# Patient Record
Sex: MALE | Race: WHITE | NOT HISPANIC OR LATINO | Employment: FULL TIME | URBAN - METROPOLITAN AREA
[De-identification: names, ages, dates, MRNs, and addresses within clinical notes are randomized per-mention and may not be internally consistent; named-entity substitution may affect disease eponyms.]

---

## 2018-02-15 ENCOUNTER — OFFICE VISIT (OUTPATIENT)
Dept: FAMILY MEDICINE CLINIC | Facility: CLINIC | Age: 38
End: 2018-02-15

## 2018-02-15 VITALS
TEMPERATURE: 98.4 F | HEART RATE: 80 BPM | OXYGEN SATURATION: 98 % | WEIGHT: 213.2 LBS | BODY MASS INDEX: 28.26 KG/M2 | HEIGHT: 73 IN | SYSTOLIC BLOOD PRESSURE: 120 MMHG | DIASTOLIC BLOOD PRESSURE: 78 MMHG

## 2018-02-15 DIAGNOSIS — F32.A ANXIETY AND DEPRESSION: Primary | ICD-10-CM

## 2018-02-15 DIAGNOSIS — F41.9 ANXIETY AND DEPRESSION: Primary | ICD-10-CM

## 2018-02-15 PROBLEM — S29.011A MUSCLE STRAIN OF CHEST WALL: Status: ACTIVE | Noted: 2018-02-15

## 2018-02-15 PROBLEM — J45.909 REACTIVE AIRWAY DISEASE: Status: ACTIVE | Noted: 2018-02-15

## 2018-02-15 PROBLEM — R82.90 ABNORMAL FINDING IN URINE: Status: ACTIVE | Noted: 2018-02-15

## 2018-02-15 PROBLEM — J30.9 ATOPIC RHINITIS: Status: ACTIVE | Noted: 2018-02-15

## 2018-02-15 PROBLEM — M51.369 DEGENERATION OF INTERVERTEBRAL DISC OF LUMBAR REGION: Status: ACTIVE | Noted: 2018-02-15

## 2018-02-15 PROBLEM — M51.36 DEGENERATION OF INTERVERTEBRAL DISC OF LUMBAR REGION: Status: ACTIVE | Noted: 2018-02-15

## 2018-02-15 PROBLEM — R31.9 HEMATURIA: Status: ACTIVE | Noted: 2018-02-15

## 2018-02-15 PROBLEM — IMO0002 DISPLACEMENT OF INTERVERTEBRAL DISC: Status: ACTIVE | Noted: 2018-02-15

## 2018-02-15 PROCEDURE — 99213 OFFICE O/P EST LOW 20 MIN: CPT | Performed by: FAMILY MEDICINE

## 2018-02-15 RX ORDER — PAROXETINE HYDROCHLORIDE 20 MG/1
20 TABLET, FILM COATED ORAL EVERY MORNING
Qty: 30 TABLET | Refills: 1 | Status: SHIPPED | OUTPATIENT
Start: 2018-02-15 | End: 2018-03-15 | Stop reason: SDUPTHER

## 2018-02-15 RX ORDER — MELATONIN 10 MG
CAPSULE ORAL
COMMUNITY
Start: 2014-09-29

## 2018-02-15 NOTE — PROGRESS NOTES
Subjective   Edi Dela Cruz is a 37 y.o. male who is here for   Chief Complaint   Patient presents with   • Anxiety     Re-est patient    • Depression   .     History of Present Illness   Depression: Patient complains of depression. He complains of depressed mood, difficulty concentrating, feelings of worthlessness/guilt, psychomotor agitation and recurrent thoughts of death. Onset was approximately several years ago, rapidly worsening since that time.  He denies current suicidal and homicidal plan or intent.   Family history significant for anxiety and depression.Possible organic causes contributing are: none.  Risk factors: none Previous treatment includes arleen wort and none. He complains of the following side effects from the treatment: none.  Worries about bad things might happen to his family and friends  His wife is also in the room with us and she agrees he needs medication.        The following portions of the patient's history were reviewed and updated as appropriate: allergies, current medications, past family history, past medical history, past social history, past surgical history and problem list.    Review of Systems    Objective   Physical Exam   Constitutional: He appears well-developed and well-nourished.   Cardiovascular: Normal rate.    Psychiatric: His behavior is normal. Judgment and thought content normal. His mood appears anxious. Cognition and memory are normal. He exhibits a depressed mood.   Nursing note and vitals reviewed.      Assessment/Plan   Edi was seen today for anxiety and depression.    Diagnoses and all orders for this visit:    Anxiety and depression  -     PARoxetine (PAXIL) 20 MG tablet; Take 1 tablet by mouth Every Morning.      There are no Patient Instructions on file for this visit.    There are no discontinued medications.     Return in about 1 month (around 3/15/2018) for new medication follow up.    Dr. Zane Bennett  [facility]  [place], [place]

## 2018-03-15 ENCOUNTER — OFFICE VISIT (OUTPATIENT)
Dept: FAMILY MEDICINE CLINIC | Facility: CLINIC | Age: 38
End: 2018-03-15

## 2018-03-15 VITALS
WEIGHT: 217.9 LBS | HEIGHT: 73 IN | DIASTOLIC BLOOD PRESSURE: 82 MMHG | OXYGEN SATURATION: 96 % | HEART RATE: 89 BPM | SYSTOLIC BLOOD PRESSURE: 120 MMHG | BODY MASS INDEX: 28.88 KG/M2

## 2018-03-15 DIAGNOSIS — F41.9 ANXIETY AND DEPRESSION: Primary | ICD-10-CM

## 2018-03-15 DIAGNOSIS — F32.A ANXIETY AND DEPRESSION: Primary | ICD-10-CM

## 2018-03-15 PROCEDURE — 99213 OFFICE O/P EST LOW 20 MIN: CPT | Performed by: FAMILY MEDICINE

## 2018-03-15 RX ORDER — PAROXETINE HYDROCHLORIDE 40 MG/1
40 TABLET, FILM COATED ORAL EVERY MORNING
Qty: 30 TABLET | Refills: 1 | Status: SHIPPED | OUTPATIENT
Start: 2018-03-15 | End: 2018-05-01 | Stop reason: SDUPTHER

## 2018-03-15 RX ORDER — DIAZEPAM 5 MG/1
5 TABLET ORAL 2 TIMES DAILY PRN
Qty: 10 TABLET | Refills: 0 | Status: SHIPPED | OUTPATIENT
Start: 2018-03-15 | End: 2018-10-30

## 2018-03-15 NOTE — PROGRESS NOTES
Subjective   Edi Dela Cruz is a 38 y.o. male who is here for   Chief Complaint   Patient presents with   • Follow-up   • Anxiety   • Depression   .     History of Present Illness   1 month follow up of starting Zoloft.    Anxiety: Patient complains of anxiety disorder and panic attacks.  He has the following symptoms: difficulty concentrating, feelings of losing control, irritable, racing thoughts. Onset of symptoms was approximately several years ago, gradually improving since that time. He denies current suicidal and homicidal ideation. Family history significant for anxiety and depression.Possible organic causes contributing are: none. Risk factors: none Previous treatment includes Paxil and medication.  He complains of the following side effects from the treatment: none.  Anxiety is some better  No SE  Less time thinking about horrible accidents that could happen to his family  Sleep is ok        The following portions of the patient's history were reviewed and updated as appropriate: allergies, current medications, past family history, past medical history, past social history, past surgical history and problem list.    Review of Systems    Objective   Physical Exam   Constitutional: He appears well-developed and well-nourished.   Cardiovascular: Normal rate.    Psychiatric: He has a normal mood and affect. His behavior is normal. Judgment and thought content normal.   Nursing note and vitals reviewed.      Assessment/Plan   Edi was seen today for follow-up, anxiety and depression.    Diagnoses and all orders for this visit:    Anxiety and depression  -     diazePAM (VALIUM) 5 MG tablet; Take 1 tablet by mouth 2 (Two) Times a Day As Needed for Anxiety (panic attack).  -     PARoxetine (PAXIL) 40 MG tablet; Take 1 tablet by mouth Every Morning.      Patient Instructions   Valium as needed for panic attacks      Medications Discontinued During This Encounter   Medication Reason   • PARoxetine (PAXIL) 20  MG tablet Reorder        Return in about 1 month (around 4/15/2018) for new medication follow up.    Dr. Zane Bennett  [place], [place]

## 2018-04-10 DIAGNOSIS — F41.9 ANXIETY AND DEPRESSION: ICD-10-CM

## 2018-04-10 DIAGNOSIS — F32.A ANXIETY AND DEPRESSION: ICD-10-CM

## 2018-04-10 RX ORDER — PAROXETINE HYDROCHLORIDE 20 MG/1
20 TABLET, FILM COATED ORAL EVERY MORNING
Qty: 30 TABLET | Refills: 1 | Status: SHIPPED | OUTPATIENT
Start: 2018-04-10 | End: 2018-05-01 | Stop reason: DRUGHIGH

## 2018-05-01 ENCOUNTER — OFFICE VISIT (OUTPATIENT)
Dept: FAMILY MEDICINE CLINIC | Facility: CLINIC | Age: 38
End: 2018-05-01

## 2018-05-01 VITALS
TEMPERATURE: 97.6 F | HEIGHT: 73 IN | SYSTOLIC BLOOD PRESSURE: 134 MMHG | WEIGHT: 220 LBS | DIASTOLIC BLOOD PRESSURE: 86 MMHG | OXYGEN SATURATION: 99 % | HEART RATE: 76 BPM | BODY MASS INDEX: 29.16 KG/M2

## 2018-05-01 DIAGNOSIS — F41.9 ANXIETY AND DEPRESSION: Primary | ICD-10-CM

## 2018-05-01 DIAGNOSIS — F32.A ANXIETY AND DEPRESSION: Primary | ICD-10-CM

## 2018-05-01 PROCEDURE — 99213 OFFICE O/P EST LOW 20 MIN: CPT | Performed by: FAMILY MEDICINE

## 2018-05-01 RX ORDER — FLUTICASONE PROPIONATE 50 MCG
2 SPRAY, SUSPENSION (ML) NASAL DAILY
COMMUNITY
End: 2022-03-30

## 2018-05-01 RX ORDER — PAROXETINE HYDROCHLORIDE 40 MG/1
40 TABLET, FILM COATED ORAL EVERY MORNING
Qty: 30 TABLET | Refills: 5 | Status: SHIPPED | OUTPATIENT
Start: 2018-05-01 | End: 2018-10-30 | Stop reason: SDUPTHER

## 2018-05-01 RX ORDER — IBUPROFEN 400 MG/1
400 TABLET ORAL EVERY 6 HOURS PRN
COMMUNITY
End: 2022-03-30

## 2018-05-01 NOTE — PROGRESS NOTES
Subjective   Edi Dela Cruz is a 38 y.o. male who is here for   Chief Complaint   Patient presents with   • Follow-up   • Anxiety   .   We doubled Paxil to 40 mg last ov    History of Present Illness   Anxiety: Patient complains of anxiety disorder.  He has the following symptoms: irritable. Onset of symptoms was approximately several years ago, completely resolved since that time. He denies current suicidal and homicidal ideation. Family history significant for anxiety and depression.Possible organic causes contributing are: none. Risk factors: none Previous treatment includes Paxil and medication.  He complains of the following side effects from the treatment: inability to male orgasm. libido is good, erections are good.    Anxiety is so much better  May have to change to Wellbutrin if orgasm issue remains      The following portions of the patient's history were reviewed and updated as appropriate: allergies, current medications, past family history, past medical history, past social history and problem list.    Review of Systems    Objective   Physical Exam   Constitutional: He appears well-developed and well-nourished.   Cardiovascular: Normal rate.    Pulmonary/Chest: Effort normal.   Psychiatric: He has a normal mood and affect.   Nursing note and vitals reviewed.      Assessment/Plan   Edi was seen today for follow-up and anxiety.    Diagnoses and all orders for this visit:    Anxiety and depression  -     PARoxetine (PAXIL) 40 MG tablet; Take 1 tablet by mouth Every Morning.      There are no Patient Instructions on file for this visit.    Medications Discontinued During This Encounter   Medication Reason   • PARoxetine (PAXIL) 20 MG tablet Dose adjustment   • St Johns Wort 450 MG capsule *Therapy completed   • PARoxetine (PAXIL) 40 MG tablet Reorder        Return in about 6 months (around 11/1/2018).    Dr. Zane Bennett  [facility]  [place], [place]

## 2018-05-25 DIAGNOSIS — F41.9 ANXIETY AND DEPRESSION: ICD-10-CM

## 2018-05-25 DIAGNOSIS — F32.A ANXIETY AND DEPRESSION: ICD-10-CM

## 2018-05-25 RX ORDER — PAROXETINE HYDROCHLORIDE 40 MG/1
40 TABLET, FILM COATED ORAL EVERY MORNING
Qty: 30 TABLET | Refills: 1 | Status: SHIPPED | OUTPATIENT
Start: 2018-05-25 | End: 2018-10-30 | Stop reason: SDUPTHER

## 2018-10-30 ENCOUNTER — OFFICE VISIT (OUTPATIENT)
Dept: FAMILY MEDICINE CLINIC | Facility: CLINIC | Age: 38
End: 2018-10-30

## 2018-10-30 VITALS
BODY MASS INDEX: 28.49 KG/M2 | SYSTOLIC BLOOD PRESSURE: 124 MMHG | HEIGHT: 73 IN | WEIGHT: 215 LBS | DIASTOLIC BLOOD PRESSURE: 82 MMHG | OXYGEN SATURATION: 98 % | HEART RATE: 59 BPM

## 2018-10-30 DIAGNOSIS — F41.9 ANXIETY AND DEPRESSION: Primary | ICD-10-CM

## 2018-10-30 DIAGNOSIS — F32.A ANXIETY AND DEPRESSION: Primary | ICD-10-CM

## 2018-10-30 DIAGNOSIS — Z30.09 VASECTOMY EVALUATION: ICD-10-CM

## 2018-10-30 PROBLEM — R82.90 ABNORMAL FINDING IN URINE: Status: RESOLVED | Noted: 2018-02-15 | Resolved: 2018-10-30

## 2018-10-30 PROCEDURE — 99213 OFFICE O/P EST LOW 20 MIN: CPT | Performed by: FAMILY MEDICINE

## 2018-10-30 RX ORDER — PAROXETINE HYDROCHLORIDE 40 MG/1
40 TABLET, FILM COATED ORAL EVERY MORNING
Qty: 90 TABLET | Refills: 3 | Status: SHIPPED | OUTPATIENT
Start: 2018-10-30 | End: 2019-05-03 | Stop reason: SDUPTHER

## 2018-10-30 NOTE — PROGRESS NOTES
Subjective   Edi Dela Cruz is a 38 y.o. male who is here for   Chief Complaint   Patient presents with   • Follow-up   • Anxiety   • Depression   .     History of Present Illness   Anxiety is so much better  Nearly cured  Life is much better  Work and home life improved  Working out 5 x a week  Quit drinking  Still with some orgasm issues, but not enough to stop paxil      Also would like vasectomy referral    The following portions of the patient's history were reviewed and updated as appropriate: allergies, current medications, past family history, past medical history, past social history and problem list.    Review of Systems    Objective   Physical Exam   Constitutional: He appears well-developed and well-nourished.   Cardiovascular: Normal rate.    Psychiatric: He has a normal mood and affect.   Nursing note and vitals reviewed.      Assessment/Plan   Edi was seen today for follow-up, anxiety and depression.    Diagnoses and all orders for this visit:    Anxiety and depression  -     PARoxetine (PAXIL) 40 MG tablet; Take 1 tablet by mouth Every Morning.    Vasectomy evaluation  -     Ambulatory Referral to Urology      There are no Patient Instructions on file for this visit.    Medications Discontinued During This Encounter   Medication Reason   • diazePAM (VALIUM) 5 MG tablet *Therapy completed   • PARoxetine (PAXIL) 40 MG tablet Duplicate order   • PARoxetine (PAXIL) 40 MG tablet Reorder        Return in about 6 months (around 4/30/2019).    Dr. Zane Bennett  [place], [place]

## 2019-04-30 ENCOUNTER — OFFICE VISIT (OUTPATIENT)
Dept: FAMILY MEDICINE CLINIC | Facility: CLINIC | Age: 39
End: 2019-04-30

## 2019-04-30 VITALS
OXYGEN SATURATION: 99 % | WEIGHT: 216 LBS | DIASTOLIC BLOOD PRESSURE: 98 MMHG | HEIGHT: 73 IN | BODY MASS INDEX: 28.63 KG/M2 | HEART RATE: 66 BPM | SYSTOLIC BLOOD PRESSURE: 132 MMHG

## 2019-04-30 DIAGNOSIS — F41.9 ANXIETY AND DEPRESSION: Primary | ICD-10-CM

## 2019-04-30 DIAGNOSIS — F32.A ANXIETY AND DEPRESSION: ICD-10-CM

## 2019-04-30 DIAGNOSIS — Z00.00 ROUTINE ADULT HEALTH MAINTENANCE: ICD-10-CM

## 2019-04-30 DIAGNOSIS — F51.3 SLEEP WALKING AND EATING: Primary | ICD-10-CM

## 2019-04-30 DIAGNOSIS — F32.A ANXIETY AND DEPRESSION: Primary | ICD-10-CM

## 2019-04-30 DIAGNOSIS — F41.9 ANXIETY AND DEPRESSION: ICD-10-CM

## 2019-04-30 PROBLEM — IMO0002 DISPLACEMENT OF INTERVERTEBRAL DISC: Status: RESOLVED | Noted: 2018-02-15 | Resolved: 2019-04-30

## 2019-04-30 PROBLEM — S29.011A MUSCLE STRAIN OF CHEST WALL: Status: RESOLVED | Noted: 2018-02-15 | Resolved: 2019-04-30

## 2019-04-30 PROBLEM — R31.9 HEMATURIA: Status: RESOLVED | Noted: 2018-02-15 | Resolved: 2019-04-30

## 2019-04-30 PROBLEM — Z30.09 VASECTOMY EVALUATION: Status: RESOLVED | Noted: 2018-10-30 | Resolved: 2019-04-30

## 2019-04-30 PROCEDURE — 99213 OFFICE O/P EST LOW 20 MIN: CPT | Performed by: FAMILY MEDICINE

## 2019-04-30 RX ORDER — FEXOFENADINE HCL AND PSEUDOEPHEDRINE HCI 180; 240 MG/1; MG/1
1 TABLET, EXTENDED RELEASE ORAL DAILY
COMMUNITY
End: 2019-11-05

## 2019-04-30 RX ORDER — VALACYCLOVIR HYDROCHLORIDE 1 G/1
TABLET, FILM COATED ORAL
Refills: 1 | COMMUNITY
Start: 2019-04-21 | End: 2019-11-05

## 2019-04-30 NOTE — PROGRESS NOTES
Subjective   Edi Dela Cruz is a 39 y.o. male who is here for   Chief Complaint   Patient presents with   • Anxiety   .     History of Present Illness   Anxiety: Patient complains of anxiety disorder.  He has the following symptoms: difficulty concentrating, fatigue, irritable. Onset of symptoms was approximately 10 years ago, controlled since that time. He denies current suicidal and homicidal ideation. Family history significant for anxiety.Possible organic causes contributing are: none. Risk factors: none Previous treatment includes Paxil and medication.  He complains of the following side effects from the treatment: decreased libido.    Also on Allegra D for seasonal allergies    The following portions of the patient's history were reviewed and updated as appropriate: allergies, current medications, past family history, past medical history, past social history, past surgical history and problem list.    Review of Systems    Objective   Physical Exam   Constitutional: He appears well-developed and well-nourished.   Cardiovascular: Normal rate.   Pulmonary/Chest: Effort normal.   Neurological: He is alert.   Psychiatric: He has a normal mood and affect.   Nursing note and vitals reviewed.      Assessment/Plan   Edi was seen today for anxiety.    Diagnoses and all orders for this visit:    Anxiety and depression      There are no Patient Instructions on file for this visit.    Medications Discontinued During This Encounter   Medication Reason   • Cetirizine HCl (ZYRTEC ALLERGY) 10 MG capsule *Therapy completed        Return in about 6 months (around 10/30/2019) for Annual physical.    Dr. Zane Bennett  [place], [place]

## 2019-05-03 DIAGNOSIS — F32.A ANXIETY AND DEPRESSION: ICD-10-CM

## 2019-05-03 DIAGNOSIS — F41.9 ANXIETY AND DEPRESSION: ICD-10-CM

## 2019-05-03 RX ORDER — PAROXETINE HYDROCHLORIDE 40 MG/1
40 TABLET, FILM COATED ORAL EVERY MORNING
Qty: 90 TABLET | Refills: 1 | Status: SHIPPED | OUTPATIENT
Start: 2019-05-03 | End: 2019-11-05 | Stop reason: SDUPTHER

## 2019-09-30 ENCOUNTER — RESULTS ENCOUNTER (OUTPATIENT)
Dept: FAMILY MEDICINE CLINIC | Facility: CLINIC | Age: 39
End: 2019-09-30

## 2019-09-30 DIAGNOSIS — Z00.00 ROUTINE ADULT HEALTH MAINTENANCE: ICD-10-CM

## 2019-09-30 DIAGNOSIS — F41.9 ANXIETY AND DEPRESSION: ICD-10-CM

## 2019-09-30 DIAGNOSIS — F32.A ANXIETY AND DEPRESSION: ICD-10-CM

## 2019-10-21 LAB
ALBUMIN SERPL-MCNC: 4.8 G/DL (ref 3.5–5.2)
ALBUMIN/GLOB SERPL: 1.8 G/DL
ALP SERPL-CCNC: 79 U/L (ref 39–117)
ALT SERPL-CCNC: 35 U/L (ref 1–41)
APPEARANCE UR: CLEAR
AST SERPL-CCNC: 32 U/L (ref 1–40)
BILIRUB SERPL-MCNC: 0.7 MG/DL (ref 0.2–1.2)
BILIRUB UR QL STRIP: NEGATIVE
BUN SERPL-MCNC: 21 MG/DL (ref 6–20)
BUN/CREAT SERPL: 19.8 (ref 7–25)
CALCIUM SERPL-MCNC: 9.4 MG/DL (ref 8.6–10.5)
CHLORIDE SERPL-SCNC: 104 MMOL/L (ref 98–107)
CHOLEST SERPL-MCNC: 178 MG/DL (ref 0–200)
CO2 SERPL-SCNC: 28.5 MMOL/L (ref 22–29)
COLOR UR: YELLOW
CREAT SERPL-MCNC: 1.06 MG/DL (ref 0.76–1.27)
ERYTHROCYTE [DISTWIDTH] IN BLOOD BY AUTOMATED COUNT: 13.2 % (ref 12.3–15.4)
GLOBULIN SER CALC-MCNC: 2.7 GM/DL
GLUCOSE SERPL-MCNC: 104 MG/DL (ref 65–99)
GLUCOSE UR QL: NEGATIVE
HCT VFR BLD AUTO: 47.4 % (ref 37.5–51)
HDLC SERPL-MCNC: 23 MG/DL (ref 40–60)
HGB BLD-MCNC: 16 G/DL (ref 13–17.7)
HGB UR QL STRIP: NEGATIVE
KETONES UR QL STRIP: NEGATIVE
LDLC SERPL CALC-MCNC: ABNORMAL MG/DL
LEUKOCYTE ESTERASE UR QL STRIP: NEGATIVE
MCH RBC QN AUTO: 30.4 PG (ref 26.6–33)
MCHC RBC AUTO-ENTMCNC: 33.8 G/DL (ref 31.5–35.7)
MCV RBC AUTO: 89.9 FL (ref 79–97)
NITRITE UR QL STRIP: NEGATIVE
PH UR STRIP: 6 [PH] (ref 5–8)
PLATELET # BLD AUTO: 210 10*3/MM3 (ref 140–450)
POTASSIUM SERPL-SCNC: 4.6 MMOL/L (ref 3.5–5.2)
PROT SERPL-MCNC: 7.5 G/DL (ref 6–8.5)
PROT UR QL STRIP: ABNORMAL
RBC # BLD AUTO: 5.27 10*6/MM3 (ref 4.14–5.8)
SODIUM SERPL-SCNC: 141 MMOL/L (ref 136–145)
SP GR UR: 1.03 (ref 1–1.03)
TRIGL SERPL-MCNC: 483 MG/DL (ref 0–150)
TSH SERPL DL<=0.005 MIU/L-ACNC: 1.66 UIU/ML (ref 0.27–4.2)
UROBILINOGEN UR STRIP-MCNC: ABNORMAL MG/DL
VLDLC SERPL CALC-MCNC: ABNORMAL MG/DL
WBC # BLD AUTO: 3.81 10*3/MM3 (ref 3.4–10.8)

## 2019-11-05 ENCOUNTER — OFFICE VISIT (OUTPATIENT)
Dept: FAMILY MEDICINE CLINIC | Facility: CLINIC | Age: 39
End: 2019-11-05

## 2019-11-05 VITALS
HEART RATE: 66 BPM | SYSTOLIC BLOOD PRESSURE: 132 MMHG | WEIGHT: 214 LBS | DIASTOLIC BLOOD PRESSURE: 80 MMHG | HEIGHT: 73 IN | OXYGEN SATURATION: 100 % | BODY MASS INDEX: 28.36 KG/M2

## 2019-11-05 DIAGNOSIS — F51.3 SLEEP WALKING AND EATING: ICD-10-CM

## 2019-11-05 DIAGNOSIS — Z00.00 ROUTINE ADULT HEALTH MAINTENANCE: Primary | ICD-10-CM

## 2019-11-05 DIAGNOSIS — F32.A ANXIETY AND DEPRESSION: ICD-10-CM

## 2019-11-05 DIAGNOSIS — F41.9 ANXIETY AND DEPRESSION: ICD-10-CM

## 2019-11-05 PROBLEM — E78.1 HYPERTRIGLYCERIDEMIA: Status: RESOLVED | Noted: 2019-11-05 | Resolved: 2019-11-05

## 2019-11-05 PROBLEM — E78.1 HYPERTRIGLYCERIDEMIA: Status: ACTIVE | Noted: 2019-11-05

## 2019-11-05 PROCEDURE — 99396 PREV VISIT EST AGE 40-64: CPT | Performed by: FAMILY MEDICINE

## 2019-11-05 PROCEDURE — 90471 IMMUNIZATION ADMIN: CPT | Performed by: FAMILY MEDICINE

## 2019-11-05 PROCEDURE — 90674 CCIIV4 VAC NO PRSV 0.5 ML IM: CPT | Performed by: FAMILY MEDICINE

## 2019-11-05 RX ORDER — PAROXETINE HYDROCHLORIDE 20 MG/1
20 TABLET, FILM COATED ORAL EVERY MORNING
Qty: 7 TABLET | Refills: 0 | Status: SHIPPED | OUTPATIENT
Start: 2019-11-05 | End: 2019-11-26 | Stop reason: SDUPTHER

## 2019-11-05 NOTE — PROGRESS NOTES
"  Chief Complaint   Patient presents with   • Annual Exam   • Depression     would like to decrease paxil        Subjective   Edi Dela Cruz is a 39 y.o. male and is here for a yearly physical exam. The patient reports problems - feels great , working out 5 x a week,  still has his lifelong odd night time eating impulse..    Do you take any herbs or supplements that were not prescribed by a doctor? yes. If so, these will be added to active medication list.    The following portions of the patient's history were reviewed and updated as appropriate: allergies, current medications, past family history, past medical history, past social history, past surgical history and problem list.    Social and Family and Surgical History reviewed and updated today, see Rooming tab.    Health History, Preventive Measures and Vaccination flow sheets reviewed and updated today.    Patient's current medical chart in Epic; including previous office notes, imaging, labs, specialist's evaluation either in notes or in Media tab reviewed today.    Other pertinent medical information also reviewed thru Care Everywhere function is also reviewed today.    Review of Systems  Review of Systems  A comprehensive review of systems was negative.    Vitals:    11/05/19 1113   BP: 132/80   BP Location: Left arm   Patient Position: Sitting   Cuff Size: Adult   Pulse: 66   SpO2: 100%   Weight: 97.1 kg (214 lb)   Height: 184.2 cm (72.5\")       General Appearance:  Alert, cooperative, no distress, appears stated age   Head:  Normocephalic, without obvious abnormality, atraumatic   Eyes:  PERRL, conjunctiva/corneas clear, EOM's intact.   Ears:  Normal TM's and external ear canals, both ears   Nose: Nares normal, septum midline, mucosa normal, no drainage or sinus tenderness   Throat: Lips, mucosa, and tongue normal; teeth and gums normal   Neck: Supple, symmetrical, trachea midline, no adenopathy;   thyroid: No enlargement/tenderness/nodules; no " carotid  bruit   Back:  Symmetric, no curvature, ROM normal, no CVA tenderness   Lungs:  Clear to auscultation bilaterally, respirations unlabored   Chest wall:  No tenderness or deformity   Heart:  Regular rate and rhythm, S1 and S2 normal, no murmur, rub or gallop   Abdomen:  Soft, non-tender, bowel sounds active all four quadrants,   no masses, no organomegaly   Rectal:        Extremities: Extremities normal, atraumatic, no cyanosis or edema   Pulses: 2+ and symmetric all extremities   Skin: Skin color, texture, turgor normal, no rashes or lesions   Lymph nodes: Cervical, supraclavicular, and axillary nodes normal   Neurologic: CNII-XII intact. Normal strength, sensation and reflexes   throughout          Results for orders placed or performed in visit on 09/30/19   CBC (No Diff)   Result Value Ref Range    WBC 3.81 3.40 - 10.80 10*3/mm3    RBC 5.27 4.14 - 5.80 10*6/mm3    Hemoglobin 16.0 13.0 - 17.7 g/dL    Hematocrit 47.4 37.5 - 51.0 %    MCV 89.9 79.0 - 97.0 fL    MCH 30.4 26.6 - 33.0 pg    MCHC 33.8 31.5 - 35.7 g/dL    RDW 13.2 12.3 - 15.4 %    Platelets 210 140 - 450 10*3/mm3   Comprehensive Metabolic Panel   Result Value Ref Range    Glucose 104 (H) 65 - 99 mg/dL    BUN 21 (H) 6 - 20 mg/dL    Creatinine 1.06 0.76 - 1.27 mg/dL    eGFR Non African Am 77 >60 mL/min/1.73    eGFR African Am 94 >60 mL/min/1.73    BUN/Creatinine Ratio 19.8 7.0 - 25.0    Sodium 141 136 - 145 mmol/L    Potassium 4.6 3.5 - 5.2 mmol/L    Chloride 104 98 - 107 mmol/L    Total CO2 28.5 22.0 - 29.0 mmol/L    Calcium 9.4 8.6 - 10.5 mg/dL    Total Protein 7.5 6.0 - 8.5 g/dL    Albumin 4.80 3.50 - 5.20 g/dL    Globulin 2.7 gm/dL    A/G Ratio 1.8 g/dL    Total Bilirubin 0.7 0.2 - 1.2 mg/dL    Alkaline Phosphatase 79 39 - 117 U/L    AST (SGOT) 32 1 - 40 U/L    ALT (SGPT) 35 1 - 41 U/L   Lipid Panel   Result Value Ref Range    Total Cholesterol 178 0 - 200 mg/dL    Triglycerides 483 (H) 0 - 150 mg/dL    HDL Cholesterol 23 (L) 40 - 60 mg/dL     VLDL Cholesterol CANCELED mg/dL    LDL Cholesterol  CANCELED mg/dL   TSH   Result Value Ref Range    TSH 1.660 0.270 - 4.200 uIU/mL   Urinalysis With Microscopic If Indicated (No Culture) - Urine, Clean Catch   Result Value Ref Range    Specific Gravity, UA 1.029 1.005 - 1.030    pH, UA 6.0 5.0 - 8.0    Color, UA Yellow     Appearance, UA Clear Clear    Leukocytes, UA Negative Negative    Protein Trace (A) Negative    Glucose, UA Negative Negative    Ketones Negative Negative    Blood, UA Negative Negative    Bilirubin, UA Negative Negative    Urobilinogen, UA Comment     Nitrite, UA Negative Negative     Assessment/Plan   Healthy male exam.  Edi was seen today for annual exam and depression.    Diagnoses and all orders for this visit:    Routine adult health maintenance  -     Flucelvax Quad=>4Years (3101-1764)    Anxiety and depression  -     PARoxetine (PAXIL) 20 MG tablet; Take 1 tablet by mouth Every Morning.    Sleep walking and eating      1. Labs ok.   Trigs are up , but this is due to his sleep eating disorder  We were hoping the Paxil would calm an anxious or depressive issue leading to his late night eating, but no improvement, (his grandmother had same disorder)  2. Patient Counseling:  --Nutrition: Stressed importance of moderation in sodium/caffeine intake, saturated fat and cholesterol.  Discussed caloric balance, sufficient intake of fresh fruits, vegetables, fiber, calcium, iron.  --  --Exercise: Stressed the importance of regular exercise. Very good  --Substance Abuse: Discussed cessation/primary prevention of tobacco, alcohol, or other drug use; driving or other dangerous activities under the influence.    --Dental health: Discussed importance of regular tooth brushing, flossing, and dental visits.UTD  -- suggested having eyes and vision checked if needed or past due.going this month    --Immunizations reviewed.  --  3. Discussed the patient's BMI with him.  The BMI is in the acceptable  range  4. Follow up in one year    There are no Patient Instructions on file for this visit.    Medications Discontinued During This Encounter   Medication Reason   • fexofenadine-pseudoephedrine (ALLEGRA-D 24) 180-240 MG per 24 hr tablet *Therapy completed   • valACYclovir (VALTREX) 1000 MG tablet *Therapy completed   • PARoxetine (PAXIL) 40 MG tablet Reorder        Dr. Zane Bennett MD  [place], [place]  [facility]

## 2019-11-15 DIAGNOSIS — F32.A ANXIETY AND DEPRESSION: ICD-10-CM

## 2019-11-15 DIAGNOSIS — F41.9 ANXIETY AND DEPRESSION: ICD-10-CM

## 2019-11-15 RX ORDER — PAROXETINE HYDROCHLORIDE 40 MG/1
40 TABLET, FILM COATED ORAL EVERY MORNING
Qty: 90 TABLET | Refills: 1 | OUTPATIENT
Start: 2019-11-15

## 2019-11-25 ENCOUNTER — PATIENT MESSAGE (OUTPATIENT)
Dept: FAMILY MEDICINE CLINIC | Facility: CLINIC | Age: 39
End: 2019-11-25

## 2019-11-25 DIAGNOSIS — F32.A ANXIETY AND DEPRESSION: ICD-10-CM

## 2019-11-25 DIAGNOSIS — F41.9 ANXIETY AND DEPRESSION: ICD-10-CM

## 2019-11-26 RX ORDER — PAROXETINE HYDROCHLORIDE 20 MG/1
20 TABLET, FILM COATED ORAL EVERY MORNING
Qty: 90 TABLET | Refills: 3 | Status: SHIPPED | OUTPATIENT
Start: 2019-11-26 | End: 2019-12-04 | Stop reason: SDUPTHER

## 2019-11-26 NOTE — TELEPHONE ENCOUNTER
From: Edi Dela Cruz  To: Zane Bennett MD  Sent: 11/25/2019 9:54 PM EST  Subject: Prescription Question    Is it possible for me to get back on Paxil. I haven't felt great since the weening off, and I would rather get back on.

## 2019-12-03 ENCOUNTER — PATIENT MESSAGE (OUTPATIENT)
Dept: FAMILY MEDICINE CLINIC | Facility: CLINIC | Age: 39
End: 2019-12-03

## 2019-12-03 DIAGNOSIS — F41.9 ANXIETY AND DEPRESSION: ICD-10-CM

## 2019-12-03 DIAGNOSIS — F32.A ANXIETY AND DEPRESSION: ICD-10-CM

## 2019-12-04 DIAGNOSIS — F32.A ANXIETY AND DEPRESSION: ICD-10-CM

## 2019-12-04 DIAGNOSIS — F41.9 ANXIETY AND DEPRESSION: ICD-10-CM

## 2019-12-04 RX ORDER — PAROXETINE HYDROCHLORIDE 40 MG/1
40 TABLET, FILM COATED ORAL EVERY MORNING
Qty: 90 TABLET | Refills: 1 | Status: SHIPPED | OUTPATIENT
Start: 2019-12-04 | End: 2020-06-12

## 2019-12-04 RX ORDER — PAROXETINE HYDROCHLORIDE 40 MG/1
40 TABLET, FILM COATED ORAL EVERY MORNING
Qty: 90 TABLET | Refills: 1 | Status: SHIPPED | OUTPATIENT
Start: 2019-12-04 | End: 2019-12-04 | Stop reason: SDUPTHER

## 2019-12-04 NOTE — TELEPHONE ENCOUNTER
From: Edi Dela Cruz  To: Zane Bennett MD  Sent: 12/3/2019 8:26 PM EST  Subject: Prescription Question    Thank you. Any chance I can get back up to 40 soon? I misjudged my readiness for that.  ----- Message -----  From: Zane Bennett MD  Sent: 11/26/2019 8:40 AM EST  To: Edi Dela Cruz  Subject: RE: Prescription Question  Yes  I sent in paxil 20 mg to your pharmacy    Zane Bennett MD      ----- Message -----   From: Edi Dela Cruz   Sent: 11/25/2019 9:54 PM EST   To: Zane Bennett MD  Subject: Prescription Question    Is it possible for me to get back on Paxil. I haven't felt great since the weening off, and I would rather get back on.

## 2020-06-12 DIAGNOSIS — F32.A ANXIETY AND DEPRESSION: ICD-10-CM

## 2020-06-12 DIAGNOSIS — F41.9 ANXIETY AND DEPRESSION: ICD-10-CM

## 2020-06-12 RX ORDER — PAROXETINE HYDROCHLORIDE 40 MG/1
TABLET, FILM COATED ORAL
Qty: 90 TABLET | Refills: 0 | Status: SHIPPED | OUTPATIENT
Start: 2020-06-12 | End: 2020-09-15

## 2020-09-15 DIAGNOSIS — F32.A ANXIETY AND DEPRESSION: ICD-10-CM

## 2020-09-15 DIAGNOSIS — F41.9 ANXIETY AND DEPRESSION: ICD-10-CM

## 2020-09-15 RX ORDER — PAROXETINE HYDROCHLORIDE 40 MG/1
TABLET, FILM COATED ORAL
Qty: 90 TABLET | Refills: 0 | Status: SHIPPED | OUTPATIENT
Start: 2020-09-15 | End: 2020-11-10 | Stop reason: SDUPTHER

## 2020-11-02 DIAGNOSIS — Z00.00 ROUTINE ADULT HEALTH MAINTENANCE: ICD-10-CM

## 2020-11-02 DIAGNOSIS — Z00.00 ROUTINE ADULT HEALTH MAINTENANCE: Primary | ICD-10-CM

## 2020-11-04 LAB
ALBUMIN SERPL-MCNC: 4.9 G/DL (ref 3.5–5.2)
ALBUMIN/GLOB SERPL: 2.1 G/DL
ALP SERPL-CCNC: 76 U/L (ref 39–117)
ALT SERPL-CCNC: 49 U/L (ref 1–41)
APPEARANCE UR: CLEAR
AST SERPL-CCNC: 111 U/L (ref 1–40)
BILIRUB SERPL-MCNC: 1.2 MG/DL (ref 0–1.2)
BILIRUB UR QL STRIP: NEGATIVE
BUN SERPL-MCNC: 24 MG/DL (ref 6–20)
BUN/CREAT SERPL: 19.8 (ref 7–25)
CALCIUM SERPL-MCNC: 9.4 MG/DL (ref 8.6–10.5)
CHLORIDE SERPL-SCNC: 106 MMOL/L (ref 98–107)
CHOLEST SERPL-MCNC: 180 MG/DL (ref 0–200)
CO2 SERPL-SCNC: 30.3 MMOL/L (ref 22–29)
COLOR UR: YELLOW
CREAT SERPL-MCNC: 1.21 MG/DL (ref 0.76–1.27)
ERYTHROCYTE [DISTWIDTH] IN BLOOD BY AUTOMATED COUNT: 12.8 % (ref 12.3–15.4)
GLOBULIN SER CALC-MCNC: 2.3 GM/DL
GLUCOSE SERPL-MCNC: 102 MG/DL (ref 65–99)
GLUCOSE UR QL: NEGATIVE
HCT VFR BLD AUTO: 47.1 % (ref 37.5–51)
HDLC SERPL-MCNC: 30 MG/DL (ref 40–60)
HGB BLD-MCNC: 16.3 G/DL (ref 13–17.7)
HGB UR QL STRIP: NEGATIVE
KETONES UR QL STRIP: NEGATIVE
LDLC SERPL CALC-MCNC: 100 MG/DL (ref 0–100)
LEUKOCYTE ESTERASE UR QL STRIP: NEGATIVE
MCH RBC QN AUTO: 30.8 PG (ref 26.6–33)
MCHC RBC AUTO-ENTMCNC: 34.6 G/DL (ref 31.5–35.7)
MCV RBC AUTO: 89 FL (ref 79–97)
NITRITE UR QL STRIP: NEGATIVE
PH UR STRIP: 5.5 [PH] (ref 5–8)
PLATELET # BLD AUTO: 191 10*3/MM3 (ref 140–450)
POTASSIUM SERPL-SCNC: 4.3 MMOL/L (ref 3.5–5.2)
PROT SERPL-MCNC: 7.2 G/DL (ref 6–8.5)
PROT UR QL STRIP: NEGATIVE
RBC # BLD AUTO: 5.29 10*6/MM3 (ref 4.14–5.8)
SODIUM SERPL-SCNC: 142 MMOL/L (ref 136–145)
SP GR UR: 1.03 (ref 1–1.03)
TRIGL SERPL-MCNC: 293 MG/DL (ref 0–150)
UROBILINOGEN UR STRIP-MCNC: NORMAL MG/DL
VLDLC SERPL CALC-MCNC: 50 MG/DL (ref 5–40)
WBC # BLD AUTO: 4.36 10*3/MM3 (ref 3.4–10.8)

## 2020-11-10 ENCOUNTER — OFFICE VISIT (OUTPATIENT)
Dept: FAMILY MEDICINE CLINIC | Facility: CLINIC | Age: 40
End: 2020-11-10

## 2020-11-10 VITALS
OXYGEN SATURATION: 100 % | SYSTOLIC BLOOD PRESSURE: 130 MMHG | DIASTOLIC BLOOD PRESSURE: 76 MMHG | WEIGHT: 222.3 LBS | BODY MASS INDEX: 29.46 KG/M2 | HEART RATE: 66 BPM | HEIGHT: 73 IN

## 2020-11-10 DIAGNOSIS — Z00.00 ROUTINE ADULT HEALTH MAINTENANCE: Primary | ICD-10-CM

## 2020-11-10 DIAGNOSIS — F32.A ANXIETY AND DEPRESSION: ICD-10-CM

## 2020-11-10 DIAGNOSIS — F41.9 ANXIETY AND DEPRESSION: ICD-10-CM

## 2020-11-10 DIAGNOSIS — R79.89 ELEVATED LFTS: ICD-10-CM

## 2020-11-10 PROCEDURE — 99396 PREV VISIT EST AGE 40-64: CPT | Performed by: FAMILY MEDICINE

## 2020-11-10 RX ORDER — PAROXETINE HYDROCHLORIDE 40 MG/1
40 TABLET, FILM COATED ORAL EVERY MORNING
Qty: 90 TABLET | Refills: 3 | Status: SHIPPED | OUTPATIENT
Start: 2020-11-10 | End: 2021-11-15 | Stop reason: SDUPTHER

## 2020-11-10 NOTE — PROGRESS NOTES
"  Chief Complaint   Patient presents with   • Annual Exam       Subjective   Edi Dela Cruz is a 40 y.o. male and is here for a yearly physical exam. The patient reports no problems.    Do you take any herbs or supplements that were not prescribed by a doctor? yes. If so, these will be added to active medication list.    The following portions of the patient's history were reviewed and updated as appropriate: allergies, current medications, past family history, past medical history, past social history, past surgical history and problem list.    Social and Family and Surgical History reviewed and updated today, see Rooming tab.    Health History, Preventive Measures and Vaccination flow sheets reviewed and updated today.    Patient's current medical chart in Epic; including previous office notes, Mychart messages, recent phone calls, imaging, labs, specialist's evaluation either in notes or in Media tab reviewed today.    Other outside pertinent medical information also reviewed thru Care Everywhere function is also reviewed today.    Review of Systems  Review of Systems  A comprehensive review of systems was negative.    Vitals:    11/10/20 0843   BP: 130/76   BP Location: Left arm   Patient Position: Sitting   Cuff Size: Adult   Pulse: 66   SpO2: 100%   Weight: 101 kg (222 lb 4.8 oz)   Height: 184.2 cm (72.5\")       General Appearance:  Alert, cooperative, no distress, appears stated age   Head:  Normocephalic, without obvious abnormality, atraumatic   Eyes:  PERRL, conjunctiva/corneas clear, EOM's intact.   Ears:  Normal TM's and external ear canals, both ears   Nose: Nares normal, septum midline, mucosa normal, no drainage or sinus tenderness   Throat: Lips, mucosa, and tongue normal; teeth and gums normal   Neck: Supple, symmetrical, trachea midline, no adenopathy;   thyroid: No enlargement/tenderness/nodules; no carotid  bruit   Back:  Symmetric, no curvature, ROM normal, no CVA tenderness   Lungs:  Clear to " auscultation bilaterally, respirations unlabored   Chest wall:  No tenderness or deformity   Heart:  Regular rate and rhythm, S1 and S2 normal, no murmur, rub or gallop   Abdomen:  Soft, non-tender, bowel sounds active all four quadrants,   no masses, no organomegaly   Rectal:        Extremities: Extremities normal, atraumatic, no cyanosis or edema   Pulses: 2+ and symmetric all extremities   Skin: Skin color, texture, turgor normal, no rashes or lesions   Lymph nodes: Cervical, supraclavicular, and axillary nodes normal   Neurologic: CNII-XII intact. Normal strength, sensation and reflexes   throughout          Results for orders placed or performed in visit on 11/02/20   Urinalysis With Microscopic If Indicated (No Culture) - Urine, Clean Catch    Specimen: Urine, Clean Catch   Result Value Ref Range    Specific Gravity, UA 1.028 1.005 - 1.030    pH, UA 5.5 5.0 - 8.0    Color, UA Yellow     Appearance, UA Clear Clear    Leukocytes, UA Negative Negative    Protein Negative Negative    Glucose, UA Negative Negative    Ketones Negative Negative    Blood, UA Negative Negative    Bilirubin, UA Negative Negative    Urobilinogen, UA Comment     Nitrite, UA Negative Negative   CBC (No Diff)    Specimen: Blood   Result Value Ref Range    WBC 4.36 3.40 - 10.80 10*3/mm3    RBC 5.29 4.14 - 5.80 10*6/mm3    Hemoglobin 16.3 13.0 - 17.7 g/dL    Hematocrit 47.1 37.5 - 51.0 %    MCV 89.0 79.0 - 97.0 fL    MCH 30.8 26.6 - 33.0 pg    MCHC 34.6 31.5 - 35.7 g/dL    RDW 12.8 12.3 - 15.4 %    Platelets 191 140 - 450 10*3/mm3   Comprehensive Metabolic Panel    Specimen: Blood   Result Value Ref Range    Glucose 102 (H) 65 - 99 mg/dL    BUN 24 (H) 6 - 20 mg/dL    Creatinine 1.21 0.76 - 1.27 mg/dL    eGFR Non African Am 66 >60 mL/min/1.73    eGFR African Am 80 >60 mL/min/1.73    BUN/Creatinine Ratio 19.8 7.0 - 25.0    Sodium 142 136 - 145 mmol/L    Potassium 4.3 3.5 - 5.2 mmol/L    Chloride 106 98 - 107 mmol/L    Total CO2 30.3 (H) 22.0 -  29.0 mmol/L    Calcium 9.4 8.6 - 10.5 mg/dL    Total Protein 7.2 6.0 - 8.5 g/dL    Albumin 4.90 3.50 - 5.20 g/dL    Globulin 2.3 gm/dL    A/G Ratio 2.1 g/dL    Total Bilirubin 1.2 0.0 - 1.2 mg/dL    Alkaline Phosphatase 76 39 - 117 U/L    AST (SGOT) 111 (H) 1 - 40 U/L    ALT (SGPT) 49 (H) 1 - 41 U/L   Lipid Panel    Specimen: Blood   Result Value Ref Range    Total Cholesterol 180 0 - 200 mg/dL    Triglycerides 293 (H) 0 - 150 mg/dL    HDL Cholesterol 30 (L) 40 - 60 mg/dL    VLDL Cholesterol Giovanni 50 (H) 5 - 40 mg/dL    LDL Chol Calc (NIH) 100 0 - 100 mg/dL     Assessment/Plan   Healthy male exam.  Diagnoses and all orders for this visit:    1. Routine adult health maintenance (Primary)    2. Elevated LFTs  -     Hepatic Function Panel  -     SANDEEP With / DsDNA, RNP, Sjogrens A / B, Smith  -     Iron and TIBC  -     Ferritin  -     Hepatitis Panel, Acute    3. Anxiety and depression  -     PARoxetine (PAXIL) 40 MG tablet; Take 1 tablet by mouth Every Morning.  Dispense: 90 tablet; Refill: 3      1. Liver tests are up for unknown reason, will repeat today  2. Patient Counseling:  --Nutrition: Stressed importance of moderation in sodium/caffeine intake, saturated fat and cholesterol.  Discussed caloric balance, sufficient intake of fresh fruits, vegetables, fiber, calcium, iron.  --Exercise: Stressed the importance of regular exercise.   --Substance Abuse: Discussed cessation/primary prevention of tobacco, alcohol, or other drug use; driving or other dangerous activities under the influence.    --Dental health: Discussed importance of regular tooth brushing, flossing, and dental visits.  --Suggested having eyes and vision checked if needed or past due.  --Immunizations reviewed and given if needed.  -  3. Discussed the patient's BMI with him.  The BMI is in the acceptable range  4. Follow up in one year    There are no Patient Instructions on file for this visit.    Medications Discontinued During This Encounter    Medication Reason   • PARoxetine (PAXIL) 40 MG tablet Reorder        Dr. Zane Bennett MD  [place], [place]  [facility]

## 2020-11-11 LAB
ALBUMIN SERPL-MCNC: 4.7 G/DL (ref 3.5–5.2)
ALP SERPL-CCNC: 95 U/L (ref 39–117)
ALT SERPL-CCNC: 54 U/L (ref 1–41)
ANA SER QL: NEGATIVE
AST SERPL-CCNC: 46 U/L (ref 1–40)
BILIRUB DIRECT SERPL-MCNC: 0.2 MG/DL (ref 0–0.3)
BILIRUB SERPL-MCNC: 0.6 MG/DL (ref 0–1.2)
FERRITIN SERPL-MCNC: 287 NG/ML (ref 30–400)
HAV IGM SERPL QL IA: NEGATIVE
HBV CORE IGM SERPL QL IA: NEGATIVE
HBV SURFACE AG SERPL QL IA: NEGATIVE
HCV AB S/CO SERPL IA: <0.1 S/CO RATIO (ref 0–0.9)
IRON SATN MFR SERPL: 13 % (ref 20–50)
IRON SERPL-MCNC: 66 MCG/DL (ref 59–158)
PROT SERPL-MCNC: 7.4 G/DL (ref 6–8.5)
TIBC SERPL-MCNC: 504 MCG/DL
UIBC SERPL-MCNC: 438 MCG/DL (ref 112–346)

## 2021-04-02 ENCOUNTER — BULK ORDERING (OUTPATIENT)
Dept: CASE MANAGEMENT | Facility: OTHER | Age: 41
End: 2021-04-02

## 2021-04-02 DIAGNOSIS — Z23 IMMUNIZATION DUE: ICD-10-CM

## 2021-11-04 DIAGNOSIS — Z00.00 ROUTINE ADULT HEALTH MAINTENANCE: Primary | ICD-10-CM

## 2021-11-04 DIAGNOSIS — Z00.00 ROUTINE ADULT HEALTH MAINTENANCE: ICD-10-CM

## 2021-11-09 LAB
ALBUMIN SERPL-MCNC: 4.7 G/DL (ref 4–5)
ALBUMIN/GLOB SERPL: 2 {RATIO} (ref 1.2–2.2)
ALP SERPL-CCNC: 73 IU/L (ref 44–121)
ALT SERPL-CCNC: 29 IU/L (ref 0–44)
APPEARANCE UR: CLEAR
AST SERPL-CCNC: 28 IU/L (ref 0–40)
BILIRUB SERPL-MCNC: 1 MG/DL (ref 0–1.2)
BILIRUB UR QL STRIP: NEGATIVE
BUN SERPL-MCNC: 21 MG/DL (ref 6–24)
BUN/CREAT SERPL: 19 (ref 9–20)
CALCIUM SERPL-MCNC: 9.2 MG/DL (ref 8.7–10.2)
CHLORIDE SERPL-SCNC: 103 MMOL/L (ref 96–106)
CHOLEST SERPL-MCNC: 183 MG/DL (ref 100–199)
CO2 SERPL-SCNC: 23 MMOL/L (ref 20–29)
COLOR UR: YELLOW
CREAT SERPL-MCNC: 1.13 MG/DL (ref 0.76–1.27)
ERYTHROCYTE [DISTWIDTH] IN BLOOD BY AUTOMATED COUNT: 13.1 % (ref 11.6–15.4)
GLOBULIN SER CALC-MCNC: 2.4 G/DL (ref 1.5–4.5)
GLUCOSE SERPL-MCNC: 101 MG/DL (ref 65–99)
GLUCOSE UR QL: NEGATIVE
HCT VFR BLD AUTO: 47.9 % (ref 37.5–51)
HDLC SERPL-MCNC: 31 MG/DL
HGB BLD-MCNC: 16.2 G/DL (ref 13–17.7)
HGB UR QL STRIP: NEGATIVE
KETONES UR QL STRIP: NEGATIVE
LDLC SERPL CALC-MCNC: 101 MG/DL (ref 0–99)
LEUKOCYTE ESTERASE UR QL STRIP: NEGATIVE
MCH RBC QN AUTO: 31.2 PG (ref 26.6–33)
MCHC RBC AUTO-ENTMCNC: 33.8 G/DL (ref 31.5–35.7)
MCV RBC AUTO: 92 FL (ref 79–97)
MICRO URNS: NORMAL
NITRITE UR QL STRIP: NEGATIVE
PH UR STRIP: 6.5 [PH] (ref 5–7.5)
PLATELET # BLD AUTO: 198 X10E3/UL (ref 150–450)
POTASSIUM SERPL-SCNC: 4.5 MMOL/L (ref 3.5–5.2)
PROT SERPL-MCNC: 7.1 G/DL (ref 6–8.5)
PROT UR QL STRIP: NEGATIVE
RBC # BLD AUTO: 5.19 X10E6/UL (ref 4.14–5.8)
SODIUM SERPL-SCNC: 139 MMOL/L (ref 134–144)
SP GR UR: 1 (ref 1–1.03)
TRIGL SERPL-MCNC: 299 MG/DL (ref 0–149)
UROBILINOGEN UR STRIP-MCNC: 0.2 MG/DL (ref 0.2–1)
VLDLC SERPL CALC-MCNC: 51 MG/DL (ref 5–40)
WBC # BLD AUTO: 4.2 X10E3/UL (ref 3.4–10.8)

## 2021-11-15 ENCOUNTER — OFFICE VISIT (OUTPATIENT)
Dept: FAMILY MEDICINE CLINIC | Facility: CLINIC | Age: 41
End: 2021-11-15

## 2021-11-15 VITALS
HEART RATE: 65 BPM | BODY MASS INDEX: 29.95 KG/M2 | TEMPERATURE: 97.3 F | DIASTOLIC BLOOD PRESSURE: 80 MMHG | SYSTOLIC BLOOD PRESSURE: 128 MMHG | WEIGHT: 226 LBS | OXYGEN SATURATION: 98 % | HEIGHT: 73 IN

## 2021-11-15 DIAGNOSIS — Z00.00 ROUTINE ADULT HEALTH MAINTENANCE: Primary | ICD-10-CM

## 2021-11-15 DIAGNOSIS — F32.A ANXIETY AND DEPRESSION: ICD-10-CM

## 2021-11-15 DIAGNOSIS — E78.1 HYPERTRIGLYCERIDEMIA: ICD-10-CM

## 2021-11-15 DIAGNOSIS — F41.9 ANXIETY AND DEPRESSION: ICD-10-CM

## 2021-11-15 PROCEDURE — 99396 PREV VISIT EST AGE 40-64: CPT | Performed by: FAMILY MEDICINE

## 2021-11-15 RX ORDER — PAROXETINE HYDROCHLORIDE 40 MG/1
40 TABLET, FILM COATED ORAL EVERY MORNING
Qty: 90 TABLET | Refills: 3 | Status: SHIPPED | OUTPATIENT
Start: 2021-11-15 | End: 2022-11-17 | Stop reason: SDUPTHER

## 2021-11-15 NOTE — PROGRESS NOTES
"  Chief Complaint   Patient presents with   • Annual Exam       Subjective   Edi Dela Cruz is a 41 y.o. male and is here for a yearly physical exam. The patient reports problems - routien anxiety.    Do you take any herbs or supplements that were not prescribed by a doctor? yes. If so, these will be added to active medication list.    The following portions of the patient's history were reviewed and updated as appropriate: allergies, current medications, past family history, past medical history, past social history, past surgical history and problem list.    Social and Family and Surgical History reviewed and updated today, see Rooming tab.    Health History, Preventive Measures and Vaccination flow sheets reviewed and updated today.    Patient's current medical chart in Epic; including previous office notes, Mychart messages, recent phone calls, imaging, labs, specialist's evaluation either in notes or in Media tab reviewed today.    Other outside pertinent medical information also reviewed thru Care Everywhere function is also reviewed today.    Review of Systems  Review of Systems  A comprehensive review of systems was negative.    Vitals:    11/15/21 0816   BP: 128/80   BP Location: Left arm   Patient Position: Sitting   Cuff Size: Adult   Pulse: 65   Temp: 97.3 °F (36.3 °C)   TempSrc: Temporal   SpO2: 98%   Weight: 103 kg (226 lb)   Height: 184.2 cm (72.5\")     Body mass index is 30.23 kg/m².      General Appearance:  Alert, cooperative, no distress, appears stated age   Head:  Normocephalic, without obvious abnormality, atraumatic   Eyes:  PERRL, conjunctiva/corneas clear, EOM's intact.   Ears:  Normal TM's and external ear canals, both ears   Nose: Nares normal, septum midline, mucosa normal, no drainage or sinus tenderness   Throat: Lips, mucosa, and tongue normal; teeth and gums normal   Neck: Supple, symmetrical, trachea midline, no adenopathy;   thyroid: No enlargement/tenderness/nodules; no " carotid  bruit   Back:  Symmetric, no curvature, ROM normal, no CVA tenderness   Lungs:  Clear to auscultation bilaterally, respirations unlabored   Chest wall:  No tenderness or deformity   Heart:  Regular rate and rhythm, S1 and S2 normal, no murmur, rub or gallop   Abdomen:  Soft, non-tender, bowel sounds active all four quadrants,   no masses, no organomegaly   Rectal:        Extremities: Extremities normal, atraumatic, no cyanosis or edema   Pulses: 2+ and symmetric all extremities   Skin: Skin color, texture, turgor normal, no rashes or lesions   Lymph nodes: Cervical, supraclavicular, and axillary nodes normal   Neurologic: CNII-XII intact. Normal strength, sensation and reflexes   throughout          Results for orders placed or performed in visit on 11/04/21   Urinalysis With Microscopic If Indicated (No Culture) - Urine, Clean Catch    Specimen: Urine, Clean Catch   Result Value Ref Range    Specific Gravity, UA 1.005 1.005 - 1.030    pH, UA 6.5 5.0 - 7.5    Color, UA Yellow Yellow    Appearance, UA Clear Clear    Leukocytes, UA Negative Negative    Protein Negative Negative/Trace    Glucose, UA Negative Negative    Ketones Negative Negative    Blood, UA Negative Negative    Bilirubin, UA Negative Negative    Urobilinogen, UA 0.2 0.2 - 1.0 mg/dL    Nitrite, UA Negative Negative    Microscopic Examination Comment    CBC (No Diff)    Specimen: Blood   Result Value Ref Range    WBC 4.2 3.4 - 10.8 x10E3/uL    RBC 5.19 4.14 - 5.80 x10E6/uL    Hemoglobin 16.2 13.0 - 17.7 g/dL    Hematocrit 47.9 37.5 - 51.0 %    MCV 92 79 - 97 fL    MCH 31.2 26.6 - 33.0 pg    MCHC 33.8 31.5 - 35.7 g/dL    RDW 13.1 11.6 - 15.4 %    Platelets 198 150 - 450 x10E3/uL   Comprehensive Metabolic Panel    Specimen: Blood   Result Value Ref Range    Glucose 101 (H) 65 - 99 mg/dL    BUN 21 6 - 24 mg/dL    Creatinine 1.13 0.76 - 1.27 mg/dL    eGFR Non African Am 80 >59 mL/min/1.73    eGFR African Am 92 >59 mL/min/1.73    BUN/Creatinine  Ratio 19 9 - 20    Sodium 139 134 - 144 mmol/L    Potassium 4.5 3.5 - 5.2 mmol/L    Chloride 103 96 - 106 mmol/L    Total CO2 23 20 - 29 mmol/L    Calcium 9.2 8.7 - 10.2 mg/dL    Total Protein 7.1 6.0 - 8.5 g/dL    Albumin 4.7 4.0 - 5.0 g/dL    Globulin 2.4 1.5 - 4.5 g/dL    A/G Ratio 2.0 1.2 - 2.2    Total Bilirubin 1.0 0.0 - 1.2 mg/dL    Alkaline Phosphatase 73 44 - 121 IU/L    AST (SGOT) 28 0 - 40 IU/L    ALT (SGPT) 29 0 - 44 IU/L   Lipid Panel    Specimen: Blood   Result Value Ref Range    Total Cholesterol 183 100 - 199 mg/dL    Triglycerides 299 (H) 0 - 149 mg/dL    HDL Cholesterol 31 (L) >39 mg/dL    VLDL Cholesterol Giovanni 51 (H) 5 - 40 mg/dL    LDL Chol Calc (NIH) 101 (H) 0 - 99 mg/dL     Assessment/Plan   Healthy male exam.  Diagnoses and all orders for this visit:    1. Routine adult health maintenance (Primary)  -     CBC (No Diff); Future  -     Comprehensive Metabolic Panel; Future  -     Lipid Panel; Future  -     TSH; Future  -     Urinalysis With Microscopic If Indicated (No Culture) - Urine, Clean Catch; Future    2. Anxiety and depression  -     PARoxetine (PAXIL) 40 MG tablet; Take 1 tablet by mouth Every Morning.  Dispense: 90 tablet; Refill: 3  -     CBC (No Diff); Future  -     Comprehensive Metabolic Panel; Future  -     TSH; Future  -     Urinalysis With Microscopic If Indicated (No Culture) - Urine, Clean Catch; Future    3. Hypertriglyceridemia  -     Lipid Panel; Future      1. Trigs up   Still sleep waking and eating  2. Patient Counseling:  --Nutrition: Stressed importance of moderation in sodium/caffeine intake, saturated fat and cholesterol.  Discussed caloric balance, sufficient intake of fresh fruits, vegetables, fiber, calcium, iron.  --Exercise: Stressed the importance of regular exercise.   --Substance Abuse: Discussed cessation/primary prevention of tobacco, alcohol, or other drug use; driving or other dangerous activities under the influence.    --Dental health: Discussed  importance of regular tooth brushing, flossing, and dental visits.  --Suggested having eyes and vision checked if needed or past due.  --Immunizations reviewed and given if needed. Gets them from work at [employer]  -  3. Discussed the patient's BMI with him.  The BMI is in the acceptable range  4. Follow up in one year    There are no Patient Instructions on file for this visit.    Medications Discontinued During This Encounter   Medication Reason   • PARoxetine (PAXIL) 40 MG tablet Reorder   • PABLO Bennett MD  [place]  [facility]

## 2022-01-27 ENCOUNTER — HOSPITAL ENCOUNTER (OUTPATIENT)
Dept: GENERAL RADIOLOGY | Facility: HOSPITAL | Age: 42
Discharge: HOME | End: 2022-01-27
Admitting: PHYSICIAN ASSISTANT

## 2022-01-27 ENCOUNTER — OFFICE VISIT (OUTPATIENT)
Dept: FAMILY MEDICINE CLINIC | Facility: CLINIC | Age: 42
End: 2022-01-27

## 2022-01-27 VITALS
DIASTOLIC BLOOD PRESSURE: 80 MMHG | OXYGEN SATURATION: 99 % | BODY MASS INDEX: 30.23 KG/M2 | SYSTOLIC BLOOD PRESSURE: 130 MMHG | TEMPERATURE: 98 F | HEART RATE: 76 BPM | HEIGHT: 73 IN

## 2022-01-27 DIAGNOSIS — S89.90XA KNEE INJURY, INITIAL ENCOUNTER: ICD-10-CM

## 2022-01-27 DIAGNOSIS — M25.562 ACUTE PAIN OF LEFT KNEE: ICD-10-CM

## 2022-01-27 DIAGNOSIS — M25.562 ACUTE PAIN OF LEFT KNEE: Primary | ICD-10-CM

## 2022-01-27 PROCEDURE — 99213 OFFICE O/P EST LOW 20 MIN: CPT | Performed by: PHYSICIAN ASSISTANT

## 2022-01-27 PROCEDURE — 73562 X-RAY EXAM OF KNEE 3: CPT

## 2022-01-27 NOTE — PROGRESS NOTES
"Chief Complaint  Knee Pain (popping , left feels locked)    Subjective          Edi Dela Cruz presents to [facility]  History of Present Illness  Edi is a 41 year old female who presents with is wife for  left knee pain status post injury this morning.  States he has a history of left knee pain that started last summer in August 2021.  States he was walking his dog and fell onto left knee at that time.  States he has had some pain since then and a popping sensation.  He was able to bike and run without difficulty though.  This morning around 1030 he bent down to pet his dog while making breakfast.  States he squatted down in a catchers position.  States he felt a pop in his left knee and fell over.  States he landed on his lateral aspect of left knee.  Has used 600 mg of ibuprofen with some relief of pain.  He is unable to bear weight on left leg/foot.  Using crutches to ambulate.  Has been applying ice since injury this morning.  States he is unable to bend his knee all the way.  Review of Systems   Musculoskeletal:        Left knee pain   All other systems reviewed and are negative.    Objective   Vital Signs:   /80   Pulse 76   Temp 98 °F (36.7 °C)   Ht 184.2 cm (72.5\")   SpO2 99%   BMI 30.23 kg/m²     Physical Exam  Vitals and nursing note reviewed.   Constitutional:       Appearance: Normal appearance. He is well-developed and well-groomed.      Interventions: Face mask in place.      Comments: Patient is ambulating with crutches.  Wife in exam room wearing facial mask   HENT:      Head: Normocephalic and atraumatic.   Musculoskeletal:      Right knee: Normal.      Left knee: Bony tenderness and crepitus present. No swelling, deformity, effusion, erythema or ecchymosis. Decreased range of motion. Tenderness present over the MCL. MCL laxity present. Abnormal meniscus. Normal patellar mobility.      Instability Tests: Anterior drawer test negative. Posterior drawer " test negative. Anterior Lachman test negative. Medial Kin test positive. Lateral Kin test negative.        Legs:    Skin:     General: Skin is warm and dry.      Capillary Refill: Capillary refill takes less than 2 seconds.   Neurological:      Mental Status: He is alert and oriented to person, place, and time.   Psychiatric:         Attention and Perception: Attention and perception normal.         Mood and Affect: Mood and affect normal.         Speech: Speech normal.         Behavior: Behavior normal. Behavior is cooperative.         Thought Content: Thought content normal.         Cognition and Memory: Cognition and memory normal.         Judgment: Judgment normal.        Result Review :                 Assessment and Plan {CC Problem List  Visit Diagnosis  ROS  Review (Popup)  Health Maintenance  Quality  BestPractice  Medications  SmartSets  SnapShot Encounters  Media :23}   Diagnoses and all orders for this visit:    1. Acute pain of left knee (Primary)  -     XR Knee 3 View Left; Future  -     MRI Knee Left Without Contrast; Future    2. Knee injury, initial encounter  -     XR Knee 3 View Left; Future  -     MRI Knee Left Without Contrast; Future    Edi is a 41-year-old male who presents with new left knee pain status post injury.  We will proceed with left knee x-ray at the [facility] facility today.  I will schedule MRI of left knee due to his twisting motion with injury of the left knee.  Will use ibuprofen 600-800 mg 3 times daily.  Edi will continue to RICE his knee.  May use his crutches for ambulation as needed.  He voiced understanding.    Follow Up   Return if symptoms worsen or fail to improve.  Patient was given instructions and counseling regarding his condition or for health maintenance advice. Please see specific information pulled into the AVS if appropriate.     [facility]  [facility]  [address] [address]  Dept: [phone]  Dept Fax: [phone]  Loc: [phone]  Loc Fax: [phone]

## 2022-01-28 ENCOUNTER — TELEPHONE (OUTPATIENT)
Dept: FAMILY MEDICINE CLINIC | Facility: CLINIC | Age: 42
End: 2022-01-28

## 2022-01-28 NOTE — TELEPHONE ENCOUNTER
PATIENT'S WIFE IS CALLING IN ASKING FOR A CALLBACK.  SINCE PATIENT HAS HAD THE XRAY YESTERDAY.  THEY WERE TOLD AFTER THE XRAY, THAT THE NEXT PLAN OF ACTIONS WOULD BE AN MRI.  WOULD LIKE TO KNOW IF THIS HAS BEEN SENT OVER AS A REFERRAL.    PLEASE ADVISE    [phone]

## 2022-02-07 ENCOUNTER — HOSPITAL ENCOUNTER (OUTPATIENT)
Dept: MRI IMAGING | Facility: HOSPITAL | Age: 42
Discharge: HOME | End: 2022-02-07
Admitting: PHYSICIAN ASSISTANT

## 2022-02-07 DIAGNOSIS — S89.90XA KNEE INJURY, INITIAL ENCOUNTER: ICD-10-CM

## 2022-02-07 DIAGNOSIS — M25.562 ACUTE PAIN OF LEFT KNEE: ICD-10-CM

## 2022-02-07 PROCEDURE — 73721 MRI JNT OF LWR EXTRE W/O DYE: CPT

## 2022-02-09 DIAGNOSIS — S83.242A ACUTE MEDIAL MENISCUS TEAR OF LEFT KNEE, INITIAL ENCOUNTER: ICD-10-CM

## 2022-02-09 DIAGNOSIS — S89.90XA KNEE INJURY, INITIAL ENCOUNTER: ICD-10-CM

## 2022-02-09 DIAGNOSIS — M25.562 ACUTE PAIN OF LEFT KNEE: Primary | ICD-10-CM

## 2022-02-10 ENCOUNTER — TELEPHONE (OUTPATIENT)
Dept: FAMILY MEDICINE CLINIC | Facility: CLINIC | Age: 42
End: 2022-02-10

## 2022-02-10 NOTE — TELEPHONE ENCOUNTER
Abilio to relay     This was changed yesterday by Emely, my chart message was sent back to patient stating he would be contacted by 's office.

## 2022-02-10 NOTE — TELEPHONE ENCOUNTER
Caller: Heike Dela Cruz    Relationship: Emergency Contact    Best call back number: [phone]    What is the medical concern/diagnosis: REFERRAL     What specialty or service is being requested: ORTHOPEDIC    What is the provider, practice or medical service name: DR. FARRIS    What is the office location: [place]  [facility]    Any additional details: PATIENT GAVE THE WRONG PHYSICIAN'S NAME YESTERDAY.

## 2022-02-14 DIAGNOSIS — S89.90XA KNEE INJURY, INITIAL ENCOUNTER: ICD-10-CM

## 2022-02-14 DIAGNOSIS — S83.242A ACUTE MEDIAL MENISCUS TEAR OF LEFT KNEE, INITIAL ENCOUNTER: ICD-10-CM

## 2022-02-14 DIAGNOSIS — M25.562 ACUTE PAIN OF LEFT KNEE: Primary | ICD-10-CM

## 2022-02-14 RX ORDER — MELOXICAM 15 MG/1
15 TABLET ORAL DAILY
Qty: 30 TABLET | Refills: 0 | Status: SHIPPED | OUTPATIENT
Start: 2022-02-14 | End: 2022-03-30

## 2022-02-28 ENCOUNTER — OFFICE VISIT (OUTPATIENT)
Dept: ORTHOPEDIC SURGERY | Facility: CLINIC | Age: 42
End: 2022-02-28

## 2022-02-28 ENCOUNTER — PREP FOR SURGERY (OUTPATIENT)
Dept: OTHER | Facility: HOSPITAL | Age: 42
End: 2022-02-28

## 2022-02-28 VITALS — HEIGHT: 72 IN | WEIGHT: 230 LBS | BODY MASS INDEX: 31.15 KG/M2 | RESPIRATION RATE: 18 BRPM | TEMPERATURE: 98 F

## 2022-02-28 DIAGNOSIS — S83.242A ACUTE MEDIAL MENISCUS TEAR OF LEFT KNEE, INITIAL ENCOUNTER: Primary | ICD-10-CM

## 2022-02-28 DIAGNOSIS — S83.212A BUCKET-HANDLE TEAR OF MEDIAL MENISCUS OF LEFT KNEE AS CURRENT INJURY, INITIAL ENCOUNTER: Primary | ICD-10-CM

## 2022-02-28 PROCEDURE — 99204 OFFICE O/P NEW MOD 45 MIN: CPT | Performed by: ORTHOPAEDIC SURGERY

## 2022-02-28 RX ORDER — VANCOMYCIN HYDROCHLORIDE 1 G/200ML
1000 INJECTION, SOLUTION INTRAVENOUS ONCE
Status: CANCELLED | OUTPATIENT
Start: 2022-04-11 | End: 2022-02-28

## 2022-02-28 RX ORDER — MULTIPLE VITAMINS W/ MINERALS TAB 9MG-400MCG
1 TAB ORAL DAILY
COMMUNITY
End: 2022-11-17

## 2022-03-02 PROBLEM — S83.242A ACUTE MEDIAL MENISCUS TEAR OF LEFT KNEE: Status: ACTIVE | Noted: 2022-03-02

## 2022-03-03 ENCOUNTER — PATIENT ROUNDING (BHMG ONLY) (OUTPATIENT)
Dept: ORTHOPEDIC SURGERY | Facility: CLINIC | Age: 42
End: 2022-03-03

## 2022-03-03 NOTE — PROGRESS NOTES
A NEHP message has been sent to the patient for PATIENT ROUNDING with [facility]

## 2022-03-30 ENCOUNTER — PRE-ADMISSION TESTING (OUTPATIENT)
Dept: PREADMISSION TESTING | Facility: HOSPITAL | Age: 42
End: 2022-03-30

## 2022-03-30 VITALS
RESPIRATION RATE: 18 BRPM | WEIGHT: 230.2 LBS | SYSTOLIC BLOOD PRESSURE: 120 MMHG | OXYGEN SATURATION: 98 % | HEIGHT: 73 IN | TEMPERATURE: 98.2 F | DIASTOLIC BLOOD PRESSURE: 95 MMHG | BODY MASS INDEX: 30.51 KG/M2 | HEART RATE: 81 BPM

## 2022-03-30 LAB
ANION GAP SERPL CALCULATED.3IONS-SCNC: 10 MMOL/L (ref 5–15)
BUN SERPL-MCNC: 18 MG/DL (ref 6–20)
BUN/CREAT SERPL: 16.8 (ref 7–25)
CALCIUM SPEC-SCNC: 9.3 MG/DL (ref 8.6–10.5)
CHLORIDE SERPL-SCNC: 102 MMOL/L (ref 98–107)
CO2 SERPL-SCNC: 26 MMOL/L (ref 22–29)
CREAT SERPL-MCNC: 1.07 MG/DL (ref 0.76–1.27)
DEPRECATED RDW RBC AUTO: 45.5 FL (ref 37–54)
EGFRCR SERPLBLD CKD-EPI 2021: 88.3 ML/MIN/1.73
ERYTHROCYTE [DISTWIDTH] IN BLOOD BY AUTOMATED COUNT: 13.8 % (ref 12.3–15.4)
GLUCOSE SERPL-MCNC: 89 MG/DL (ref 65–99)
HCT VFR BLD AUTO: 46.4 % (ref 37.5–51)
HGB BLD-MCNC: 16.1 G/DL (ref 13–17.7)
MCH RBC QN AUTO: 31.3 PG (ref 26.6–33)
MCHC RBC AUTO-ENTMCNC: 34.7 G/DL (ref 31.5–35.7)
MCV RBC AUTO: 90.3 FL (ref 79–97)
PLATELET # BLD AUTO: 218 10*3/MM3 (ref 140–450)
PMV BLD AUTO: 9 FL (ref 6–12)
POTASSIUM SERPL-SCNC: 4.2 MMOL/L (ref 3.5–5.2)
RBC # BLD AUTO: 5.14 10*6/MM3 (ref 4.14–5.8)
SODIUM SERPL-SCNC: 138 MMOL/L (ref 136–145)
WBC NRBC COR # BLD: 3.96 10*3/MM3 (ref 3.4–10.8)

## 2022-03-30 PROCEDURE — 80048 BASIC METABOLIC PNL TOTAL CA: CPT

## 2022-03-30 PROCEDURE — 36415 COLL VENOUS BLD VENIPUNCTURE: CPT

## 2022-03-30 PROCEDURE — 85027 COMPLETE CBC AUTOMATED: CPT

## 2022-04-08 ENCOUNTER — APPOINTMENT (OUTPATIENT)
Dept: LAB | Facility: HOSPITAL | Age: 42
End: 2022-04-08

## 2022-04-11 ENCOUNTER — LAB (OUTPATIENT)
Dept: LAB | Facility: HOSPITAL | Age: 42
End: 2022-04-11

## 2022-04-11 DIAGNOSIS — S83.242A ACUTE MEDIAL MENISCUS TEAR OF LEFT KNEE, INITIAL ENCOUNTER: ICD-10-CM

## 2022-04-11 LAB — SARS-COV-2 ORF1AB RESP QL NAA+PROBE: NOT DETECTED

## 2022-04-11 PROCEDURE — U0004 COV-19 TEST NON-CDC HGH THRU: HCPCS

## 2022-04-11 PROCEDURE — C9803 HOPD COVID-19 SPEC COLLECT: HCPCS

## 2022-04-12 ENCOUNTER — HOSPITAL ENCOUNTER (OUTPATIENT)
Facility: HOSPITAL | Age: 42
Setting detail: HOSPITAL OUTPATIENT SURGERY
Discharge: HOME | End: 2022-04-12
Attending: ORTHOPAEDIC SURGERY | Admitting: ORTHOPAEDIC SURGERY

## 2022-04-12 ENCOUNTER — ANESTHESIA EVENT (OUTPATIENT)
Dept: PERIOP | Facility: HOSPITAL | Age: 42
End: 2022-04-12

## 2022-04-12 ENCOUNTER — ANESTHESIA (OUTPATIENT)
Dept: PERIOP | Facility: HOSPITAL | Age: 42
End: 2022-04-12

## 2022-04-12 VITALS
BODY MASS INDEX: 30.39 KG/M2 | DIASTOLIC BLOOD PRESSURE: 88 MMHG | WEIGHT: 229.28 LBS | TEMPERATURE: 97.9 F | SYSTOLIC BLOOD PRESSURE: 135 MMHG | HEART RATE: 88 BPM | RESPIRATION RATE: 16 BRPM | HEIGHT: 73 IN | OXYGEN SATURATION: 94 %

## 2022-04-12 DIAGNOSIS — S83.242A ACUTE MEDIAL MENISCUS TEAR OF LEFT KNEE, INITIAL ENCOUNTER: ICD-10-CM

## 2022-04-12 PROCEDURE — 25010000002 VANCOMYCIN PER 500 MG: Performed by: ORTHOPAEDIC SURGERY

## 2022-04-12 PROCEDURE — 25010000002 HYDROMORPHONE PER 4 MG: Performed by: NURSE ANESTHETIST, CERTIFIED REGISTERED

## 2022-04-12 PROCEDURE — 29881 ARTHRS KNE SRG MNISECTMY M/L: CPT | Performed by: ORTHOPAEDIC SURGERY

## 2022-04-12 PROCEDURE — 25010000002 MIDAZOLAM PER 1 MG: Performed by: ANESTHESIOLOGY

## 2022-04-12 PROCEDURE — 25010000002 HYDROMORPHONE PER 4 MG: Performed by: ANESTHESIOLOGY

## 2022-04-12 PROCEDURE — 76942 ECHO GUIDE FOR BIOPSY: CPT | Performed by: ORTHOPAEDIC SURGERY

## 2022-04-12 PROCEDURE — 25010000002 METHYLPREDNISOLONE PER 80 MG: Performed by: ORTHOPAEDIC SURGERY

## 2022-04-12 PROCEDURE — 25010000002 DEXAMETHASONE PER 1 MG: Performed by: NURSE ANESTHETIST, CERTIFIED REGISTERED

## 2022-04-12 PROCEDURE — 25010000002 ONDANSETRON PER 1 MG: Performed by: NURSE ANESTHETIST, CERTIFIED REGISTERED

## 2022-04-12 PROCEDURE — 25010000002 FENTANYL CITRATE (PF) 50 MCG/ML SOLUTION: Performed by: ANESTHESIOLOGY

## 2022-04-12 PROCEDURE — 25010000002 PROPOFOL 10 MG/ML EMULSION: Performed by: NURSE ANESTHETIST, CERTIFIED REGISTERED

## 2022-04-12 PROCEDURE — 25010000002 FENTANYL CITRATE (PF) 50 MCG/ML SOLUTION: Performed by: NURSE ANESTHETIST, CERTIFIED REGISTERED

## 2022-04-12 RX ORDER — HYDROMORPHONE HCL 110MG/55ML
PATIENT CONTROLLED ANALGESIA SYRINGE INTRAVENOUS AS NEEDED
Status: DISCONTINUED | OUTPATIENT
Start: 2022-04-12 | End: 2022-04-12 | Stop reason: SURG

## 2022-04-12 RX ORDER — MIDAZOLAM HYDROCHLORIDE 1 MG/ML
1 INJECTION INTRAMUSCULAR; INTRAVENOUS
Status: COMPLETED | OUTPATIENT
Start: 2022-04-12 | End: 2022-04-12

## 2022-04-12 RX ORDER — HYDROCODONE BITARTRATE AND ACETAMINOPHEN 5; 325 MG/1; MG/1
1 TABLET ORAL EVERY 4 HOURS PRN
Qty: 30 TABLET | Refills: 0 | Status: SHIPPED | OUTPATIENT
Start: 2022-04-12 | End: 2022-11-17

## 2022-04-12 RX ORDER — ONDANSETRON 2 MG/ML
INJECTION INTRAMUSCULAR; INTRAVENOUS AS NEEDED
Status: DISCONTINUED | OUTPATIENT
Start: 2022-04-12 | End: 2022-04-12 | Stop reason: SURG

## 2022-04-12 RX ORDER — HYDROCODONE BITARTRATE AND ACETAMINOPHEN 5; 325 MG/1; MG/1
1 TABLET ORAL ONCE AS NEEDED
Status: DISCONTINUED | OUTPATIENT
Start: 2022-04-12 | End: 2022-04-12 | Stop reason: HOSPADM

## 2022-04-12 RX ORDER — LIDOCAINE HYDROCHLORIDE 20 MG/ML
INJECTION, SOLUTION INFILTRATION; PERINEURAL AS NEEDED
Status: DISCONTINUED | OUTPATIENT
Start: 2022-04-12 | End: 2022-04-12 | Stop reason: SURG

## 2022-04-12 RX ORDER — VANCOMYCIN HYDROCHLORIDE 1 G/200ML
1000 INJECTION, SOLUTION INTRAVENOUS ONCE
Status: COMPLETED | OUTPATIENT
Start: 2022-04-12 | End: 2022-04-12

## 2022-04-12 RX ORDER — PROPOFOL 10 MG/ML
VIAL (ML) INTRAVENOUS AS NEEDED
Status: DISCONTINUED | OUTPATIENT
Start: 2022-04-12 | End: 2022-04-12 | Stop reason: SURG

## 2022-04-12 RX ORDER — SODIUM CHLORIDE, SODIUM LACTATE, POTASSIUM CHLORIDE, AND CALCIUM CHLORIDE .6; .31; .03; .02 G/100ML; G/100ML; G/100ML; G/100ML
IRRIGANT IRRIGATION AS NEEDED
Status: DISCONTINUED | OUTPATIENT
Start: 2022-04-12 | End: 2022-04-12 | Stop reason: HOSPADM

## 2022-04-12 RX ORDER — EPHEDRINE SULFATE 50 MG/ML
5 INJECTION, SOLUTION INTRAVENOUS ONCE AS NEEDED
Status: DISCONTINUED | OUTPATIENT
Start: 2022-04-12 | End: 2022-04-12 | Stop reason: HOSPADM

## 2022-04-12 RX ORDER — SODIUM CHLORIDE, SODIUM LACTATE, POTASSIUM CHLORIDE, CALCIUM CHLORIDE 600; 310; 30; 20 MG/100ML; MG/100ML; MG/100ML; MG/100ML
9 INJECTION, SOLUTION INTRAVENOUS CONTINUOUS PRN
Status: DISCONTINUED | OUTPATIENT
Start: 2022-04-12 | End: 2022-04-12 | Stop reason: HOSPADM

## 2022-04-12 RX ORDER — DEXAMETHASONE SODIUM PHOSPHATE 10 MG/ML
INJECTION INTRAMUSCULAR; INTRAVENOUS AS NEEDED
Status: DISCONTINUED | OUTPATIENT
Start: 2022-04-12 | End: 2022-04-12 | Stop reason: SURG

## 2022-04-12 RX ORDER — LIDOCAINE HYDROCHLORIDE 10 MG/ML
0.5 INJECTION, SOLUTION EPIDURAL; INFILTRATION; INTRACAUDAL; PERINEURAL ONCE AS NEEDED
Status: DISCONTINUED | OUTPATIENT
Start: 2022-04-12 | End: 2022-04-12 | Stop reason: HOSPADM

## 2022-04-12 RX ORDER — SODIUM CHLORIDE 0.9 % (FLUSH) 0.9 %
10 SYRINGE (ML) INJECTION EVERY 12 HOURS SCHEDULED
Status: DISCONTINUED | OUTPATIENT
Start: 2022-04-12 | End: 2022-04-12 | Stop reason: HOSPADM

## 2022-04-12 RX ORDER — ACETAMINOPHEN 325 MG/1
650 TABLET ORAL ONCE
Status: COMPLETED | OUTPATIENT
Start: 2022-04-12 | End: 2022-04-12

## 2022-04-12 RX ORDER — FENTANYL CITRATE 50 UG/ML
50 INJECTION, SOLUTION INTRAMUSCULAR; INTRAVENOUS
Status: DISCONTINUED | OUTPATIENT
Start: 2022-04-12 | End: 2022-04-12 | Stop reason: HOSPADM

## 2022-04-12 RX ORDER — ONDANSETRON 2 MG/ML
4 INJECTION INTRAMUSCULAR; INTRAVENOUS ONCE AS NEEDED
Status: DISCONTINUED | OUTPATIENT
Start: 2022-04-12 | End: 2022-04-12 | Stop reason: HOSPADM

## 2022-04-12 RX ORDER — FENTANYL CITRATE 50 UG/ML
INJECTION, SOLUTION INTRAMUSCULAR; INTRAVENOUS AS NEEDED
Status: DISCONTINUED | OUTPATIENT
Start: 2022-04-12 | End: 2022-04-12 | Stop reason: SURG

## 2022-04-12 RX ORDER — FLUMAZENIL 0.1 MG/ML
0.2 INJECTION INTRAVENOUS AS NEEDED
Status: DISCONTINUED | OUTPATIENT
Start: 2022-04-12 | End: 2022-04-12 | Stop reason: HOSPADM

## 2022-04-12 RX ORDER — HYDROCODONE BITARTRATE AND ACETAMINOPHEN 7.5; 325 MG/1; MG/1
1 TABLET ORAL EVERY 4 HOURS PRN
Status: DISCONTINUED | OUTPATIENT
Start: 2022-04-12 | End: 2022-04-12 | Stop reason: HOSPADM

## 2022-04-12 RX ORDER — HYDROMORPHONE HYDROCHLORIDE 1 MG/ML
0.5 INJECTION, SOLUTION INTRAMUSCULAR; INTRAVENOUS; SUBCUTANEOUS
Status: DISCONTINUED | OUTPATIENT
Start: 2022-04-12 | End: 2022-04-12 | Stop reason: HOSPADM

## 2022-04-12 RX ORDER — SODIUM CHLORIDE 0.9 % (FLUSH) 0.9 %
10 SYRINGE (ML) INJECTION AS NEEDED
Status: DISCONTINUED | OUTPATIENT
Start: 2022-04-12 | End: 2022-04-12 | Stop reason: HOSPADM

## 2022-04-12 RX ADMIN — DEXAMETHASONE SODIUM PHOSPHATE 8 MG: 10 INJECTION INTRAMUSCULAR; INTRAVENOUS at 09:58

## 2022-04-12 RX ADMIN — VANCOMYCIN HYDROCHLORIDE 1000 MG: 1 INJECTION, SOLUTION INTRAVENOUS at 09:20

## 2022-04-12 RX ADMIN — HYDROCODONE BITARTRATE AND ACETAMINOPHEN 1 TABLET: 7.5; 325 TABLET ORAL at 11:10

## 2022-04-12 RX ADMIN — FENTANYL CITRATE 50 MCG: 50 INJECTION INTRAMUSCULAR; INTRAVENOUS at 11:05

## 2022-04-12 RX ADMIN — HYDROMORPHONE HYDROCHLORIDE 0.5 MG: 1 INJECTION, SOLUTION INTRAMUSCULAR; INTRAVENOUS; SUBCUTANEOUS at 11:10

## 2022-04-12 RX ADMIN — ACETAMINOPHEN 650 MG: 325 TABLET, FILM COATED ORAL at 07:46

## 2022-04-12 RX ADMIN — SODIUM CHLORIDE, POTASSIUM CHLORIDE, SODIUM LACTATE AND CALCIUM CHLORIDE 9 ML/HR: 600; 310; 30; 20 INJECTION, SOLUTION INTRAVENOUS at 07:53

## 2022-04-12 RX ADMIN — PROPOFOL 200 MG: 10 INJECTION, EMULSION INTRAVENOUS at 09:52

## 2022-04-12 RX ADMIN — LIDOCAINE HYDROCHLORIDE 100 MG: 20 INJECTION, SOLUTION INFILTRATION; PERINEURAL at 09:52

## 2022-04-12 RX ADMIN — MIDAZOLAM 1 MG: 1 INJECTION INTRAMUSCULAR; INTRAVENOUS at 08:05

## 2022-04-12 RX ADMIN — FENTANYL CITRATE 50 MCG: 50 INJECTION INTRAMUSCULAR; INTRAVENOUS at 10:21

## 2022-04-12 RX ADMIN — MIDAZOLAM 1 MG: 1 INJECTION INTRAMUSCULAR; INTRAVENOUS at 08:00

## 2022-04-12 RX ADMIN — ONDANSETRON 4 MG: 2 INJECTION INTRAMUSCULAR; INTRAVENOUS at 10:30

## 2022-04-12 RX ADMIN — FENTANYL CITRATE 50 MCG: 50 INJECTION INTRAMUSCULAR; INTRAVENOUS at 11:39

## 2022-04-12 RX ADMIN — FENTANYL CITRATE 50 MCG: 50 INJECTION INTRAMUSCULAR; INTRAVENOUS at 10:40

## 2022-04-12 RX ADMIN — HYDROMORPHONE HYDROCHLORIDE 1 MG: 2 INJECTION, SOLUTION INTRAMUSCULAR; INTRAVENOUS; SUBCUTANEOUS at 09:50

## 2022-04-12 RX ADMIN — PROPOFOL 100 MG: 10 INJECTION, EMULSION INTRAVENOUS at 09:53

## 2022-04-12 NOTE — ANESTHESIA POSTPROCEDURE EVALUATION
Patient: Edi Dela Cruz    Procedure Summary     Date: 04/12/22 Room / Location:  [facility]  [facility]    Anesthesia Start: 0946 Anesthesia Stop: 1044    Procedure: KNEE ARTHROSCOPY partial  medial menisectomy (Left Knee) Diagnosis:       Acute medial meniscus tear of left knee, initial encounter      (Acute medial meniscus tear of left knee, initial encounter [S83.242A])    Surgeons: Lyndsey Gaitan MD Provider: Rufus Velasquez MD    Anesthesia Type: general ASA Status: 2          Anesthesia Type: general    Vitals  Vitals Value Taken Time   /87 04/12/22 1146   Temp 36.6 °C (97.9 °F) 04/12/22 1145   Pulse 91 04/12/22 1151   Resp 16 04/12/22 1145   SpO2 96 % 04/12/22 1151   Vitals shown include unvalidated device data.        Post Anesthesia Care and Evaluation    Patient location during evaluation: bedside  Patient participation: complete - patient participated  Level of consciousness: awake  Pain management: adequate  Airway patency: patent  Anesthetic complications: No anesthetic complications  PONV Status: controlled  Cardiovascular status: acceptable  Respiratory status: acceptable  Hydration status: acceptable    Comments: --------------------            04/12/22               1224     --------------------   BP:       135/88     Pulse:      88       Resp:       16       Temp:                SpO2:      94%      --------------------

## 2022-04-12 NOTE — ANESTHESIA PROCEDURE NOTES
Airway  Urgency: elective    Date/Time: 4/12/2022 9:54 AM    General Information and Staff    Patient location during procedure: OR  Anesthesiologist: Rufus Velasquez MD  CRNA: Huber Yuen CRNA    Indications and Patient Condition  Indications for airway management: airway protection    Preoxygenated: yes  MILS maintained throughout  Mask difficulty assessment: 1 - vent by mask    Final Airway Details  Final airway type: supraglottic airway      Successful airway: unique  Size 5    Number of attempts at approach: 1  Assessment: lips, teeth, and gum same as pre-op and atraumatic intubation

## 2022-04-12 NOTE — ANESTHESIA PREPROCEDURE EVALUATION
Anesthesia Evaluation     Patient summary reviewed and Nursing notes reviewed   NPO Solid Status: > 8 hours             Airway   Mallampati: II  TM distance: >3 FB  Neck ROM: full  no difficulty expected  Dental - normal exam     Pulmonary - negative pulmonary ROS and normal exam   Cardiovascular - negative cardio ROS and normal exam        Neuro/Psych  (+) psychiatric history Anxiety and Depression,    GI/Hepatic/Renal/Endo - negative ROS     Musculoskeletal     (+) back pain,   Abdominal  - normal exam   Substance History - negative use     OB/GYN negative ob/gyn ROS         Other                        Anesthesia Plan    ASA 2     general     intravenous induction     Anesthetic plan, all risks, benefits, and alternatives have been provided, discussed and informed consent has been obtained with: patient.    Plan discussed with CRNA.        CODE STATUS:

## 2022-04-12 NOTE — OP NOTE
Operative Note      Facility: [facility]  Patient Name: Edi Dela Cruz  YOB: 1980  Date: 4/12/2022  Medical Record Number: [id number]      Pre-op Diagnosis:   Acute medial meniscus tear of left knee, initial encounter [S83.242A]    Post-Op Diagnosis Codes:     * Acute medial meniscus tear of left knee, initial encounter [S83.242A] bucket-handle tear    Procedure(s):  Left KNEE ARTHROSCOPY partial  medial menisectomy    Surgeon(s):  Lyndsey Gaitan MD    Anesthesia: General  Anesthesiologist: Rufus Velasquez MD  CRNA: Huber Yuen CRNA    Staff:   Circulator: Vale Brenner RN  Scrub Person: Mavis Stokes    Assistants : none      Estimated Blood Loss: 5 mL    Specimens:    none     Drains: None    Findings: See Dictation    Complications: None      Indication for procedure: This patient has had a several month history of knee pain and has an exam and an MRI which are consistent with meniscal pathology. They understand all options and wish to proceed with arthroscopy.      Description of procedure: The patient was taken to the operating room. They were placed supine on the operating room table. After induction of adequate LMA anesthesia, IV antibiotics the patient underwent exam under anesthesia with symmetric full range of motion. Nonsterile tourniquet was applied patient was placed in the thigh boland all prominent areas were well padded and end of the table dropped. The leg was prepped and draped in usual sterile fashion. Standard lateral incision was made with 11 blade. Blunt trocar penetrated into the joint, scope followed and the evaluation began.  The patella appeared to sit centrally within the trochlear groove cartilage was intact he did have synovitis throughout the knee and some effusion when I entered the joint.  The gutters were normal then entered the medial compartment under spinal needle localization direct visualization a medial portals established.  He had an  obvious bucket-handle tear of the medial meniscus once the medial portal was established and the above said method I reduced the meniscus with a probe this was not a repairable tear and therefore it was right resected from its most posterior attachment and the anterior attachment and then removed from the joint.  I then cleaned up all the edges with biters baskets motorized roly ultimately the Apollo device.  The cartilage on both sides was normal notch was normal lateral compartment was normal            At this point everything was thoroughly irrigated it was suctioned all 3 compartments, the gutters the suprapatellar pouch were all evaluated there was no further acute pathology seen.  Everything was thoroughly irrigated it was injected with Marcaine Depo-Medrol.  The portals were closed with 3-0 nylon interrupted fashion.  Sterile dressings and Ace wraps were applied.  The patient tolerated the procedure well and was taken to recovery room in good condition.  All sponge and needle counts were correct.                    Date: 4/12/2022  Time: 10:41 EDT

## 2022-04-12 NOTE — H&P
History & Physical       Patient: Edi Dela Cruz    Date of Admission: 4/12/2022  6:59 AM    YOB: 1980    Medical Record Number: [id number]    Attending Physician: Lyndsey Gaitan MD        Chief Complaints: Acute medial meniscus tear of left knee, initial encounter [S83.242A]      History of Present Illness: This patient is has a several month history of left knee pain he has an MRI and exam is consistent with a bucket-handle tear of the medial meniscus he presents for arthroscopy partial meniscectomy     Allergies:   Allergies   Allergen Reactions   • Amoxil [Amoxicillin] Hives     All over welps. Age 39. As a adult   • Metaxalone Itching   • Penicillins Hives   • Montelukast Itching     And rash on the palms       Medications:   Home Medications:  No current facility-administered medications on file prior to encounter.     Current Outpatient Medications on File Prior to Encounter   Medication Sig   • Melatonin 10 MG capsule Take  by mouth.   • multivitamin with minerals tablet tablet Take 1 tablet by mouth Daily.   • PARoxetine (PAXIL) 40 MG tablet Take 1 tablet by mouth Every Morning.     Current Medications:  Scheduled Meds:vancomycin, 1,000 mg, Intravenous, Once      Continuous Infusions:   PRN Meds:.    Past Medical History:   Diagnosis Date   • Anxiety and depression    • COVID-19 vaccine series completed     PT HAS 2 MODERNA VACCINES AND PFIZER BOOSTER, NOT SHOWING IN CHART AND PT DIDN'T HAVE CARD TO PUT IN   • Lumbosacral disc disease 2002    herniated discs   • Tear of meniscus of knee August 2022    the reason I'm here today        Past Surgical History:   Procedure Laterality Date   • APPENDECTOMY     • HERNIA REPAIR Right     inguinal.         Social History     Occupational History   • Occupation:      Employer: [employer]   Tobacco Use   • Smoking status: Former Smoker     Packs/day: 0.25     Years: 5.00     Pack years: 1.25     Types: Cigarettes      Start date: 1997     Quit date: 2002     Years since quittin.6   • Smokeless tobacco: Never Used   Vaping Use   • Vaping Use: Never used   Substance and Sexual Activity   • Alcohol use: Not Currently     Alcohol/week: 0.0 standard drinks     Comment: none since summer 2018.   • Drug use: No   • Sexual activity: Yes     Partners: Female     Birth control/protection: Surgical      Social History     Social History Narrative   • Not on file        Family History   Problem Relation Age of Onset   • Hypertension Mother    • Depression Mother    • Anxiety disorder Mother    • Glaucoma Mother    • Hypertension Father    • Cancer Father         melanoma   • Breast cancer Sister    • Anxiety disorder Sister    • Depression Sister    • Cancer Sister         breast cancer   • Macular degeneration Maternal Grandmother    • Alcohol abuse Maternal Grandfather    • Malig Hyperthermia Neg Hx        Review of Systems      Physical Exam: 42 y.o. male  General Appearance:    Alert, cooperative, in no acute distress                    There were no vitals filed for this visit.     Head:  Normocephalic, without obvious abnormality, atraumatic   Eyes:          Conjunctivae and sclerae normal, no pallor, corneas clear,    Ears:  Ears appear intact with no abnormalities noted   Throat: No oral lesions, no thrush, oral mucosa moist   Neck: No adenopathy, supple, trachea midline, no thyromegaly,    Back:   No kyphosis present, no scoliosis present, no skin lesions,      erythema or scars, no tenderness to percussion or                   palpation,range of motion normal   Lungs:   Clear to auscultation, respirations regular, even and                 unlabored    Heart:  Regular rhythm and normal rate               Chest Wall:  No abnormalities observed   Abdomen:   Normal bowel sounds, no masses, no organomegaly, soft    nontender, nondistended, no guarding, no rebound   tenderness   Rectal:   Deferred   Extremities:  Moves all  extremities well, no edema,   no cyanosis, no redness   Pulses: Pulses palpable and equal bilaterally   Skin: No bleeding, bruising or rash   Lymph nodes: No palpable adenopathy   Neurologic: Appears neurologic intact             Assessment:  Patient Active Problem List   Diagnosis   • Atopic rhinitis   • Degeneration of intervertebral disc of lumbar region   • Reactive airway disease   • Anxiety and depression   • Sleep walking and eating   • Routine adult health maintenance   • Acute medial meniscus tear of left knee           Plan: All risks, benefits and alternatives were discussed.  Risks including but not exclusive to anesthetic complications, including death, MI, CVA, infection, bleeding DVT, PE,  fracture, residual pain and need for future surgery.  Patient understood all and agrees to proceed.            Will have staff to yes she staff patient several month history of left knee pain he has a bucket-handle tear of the medial meniscus and presents for arthroscopy and meniscectomy

## 2022-04-21 NOTE — PROGRESS NOTES
Knee Scope follow Up 1st Visit      Patient: Edi Dela Cruz        YOB: 1980      Chief Complaints: knee pain left      History of Present Illness: Pt is here f/u knee arthroscopy left knee he states he doing great he states after the first days had no pain at all very happy with where he is      Allergies:   Allergies   Allergen Reactions   • Amoxil [Amoxicillin] Hives     All over welps. Age 39. As a adult   • Metaxalone Itching   • Penicillins Hives   • Montelukast Itching     And rash on the palms       Medications:   Home Medications:  Current Outpatient Medications on File Prior to Visit   Medication Sig   • HYDROcodone-acetaminophen (NORCO) 5-325 MG per tablet Take 1 tablet by mouth Every 4 (Four) Hours As Needed for Severe Pain . 1-2 oral Q4H PRN severe pain   • Melatonin 10 MG capsule Take  by mouth.   • multivitamin with minerals tablet tablet Take 1 tablet by mouth Daily.   • PARoxetine (PAXIL) 40 MG tablet Take 1 tablet by mouth Every Morning.     No current facility-administered medications on file prior to visit.     Current Medications:  Scheduled Meds:  Continuous Infusions:No current facility-administered medications for this visit.    PRN Meds:.          Physical Exam: 42 y.o. male  General Appearance:    Alert, cooperative, in no acute distress                 There were no vitals filed for this visit.   Patient is alert and oriented ×3 no acute distress normal mood physical exam.  Physical exam of the knee, incisions looked good there is no erythema, calf is soft and non-tender.  No sign or sx of DVT      Assessment  S/P knee scope.  I did review intraoperative findings and arthroscopic pictures with the patient.          Plan: To remove sutures today place Steri-Strips and start into  physical therapy and I will have thrm follow up in 4 weeks.  If he is doing well he can follow-up as needed we did talk about quad and core strengthening and hamstring and quad  stretching

## 2022-04-22 ENCOUNTER — OFFICE VISIT (OUTPATIENT)
Dept: ORTHOPEDIC SURGERY | Facility: CLINIC | Age: 42
End: 2022-04-22

## 2022-04-22 ENCOUNTER — TELEPHONE (OUTPATIENT)
Dept: ORTHOPEDIC SURGERY | Facility: CLINIC | Age: 42
End: 2022-04-22

## 2022-04-22 VITALS — WEIGHT: 229 LBS | HEIGHT: 72 IN | BODY MASS INDEX: 31.02 KG/M2

## 2022-04-22 DIAGNOSIS — Z98.890 S/P ARTHROSCOPY OF KNEE: Primary | ICD-10-CM

## 2022-04-22 PROCEDURE — 99024 POSTOP FOLLOW-UP VISIT: CPT | Performed by: ORTHOPAEDIC SURGERY

## 2022-04-22 NOTE — TELEPHONE ENCOUNTER
Caller: BROOKS SAMANIEGO    Relationship to patient: SELF    Best call back number: [phone]    Chief complaint:LEFT KNEE      Type of visit: 1ST POST OP VISIT    Requested date:      If rescheduling, when is the original appointment: 04/22/2022     Additional notes:PATIENT WENT TO WRONG LOCATION FOR POST OP VISIT- REQ TO RESCHEDULE

## 2022-11-07 DIAGNOSIS — E78.1 HYPERTRIGLYCERIDEMIA: ICD-10-CM

## 2022-11-07 DIAGNOSIS — F32.A ANXIETY AND DEPRESSION: ICD-10-CM

## 2022-11-07 DIAGNOSIS — Z00.00 ROUTINE ADULT HEALTH MAINTENANCE: ICD-10-CM

## 2022-11-07 DIAGNOSIS — F41.9 ANXIETY AND DEPRESSION: ICD-10-CM

## 2022-11-09 LAB
ALBUMIN SERPL-MCNC: 4.9 G/DL (ref 3.5–5.2)
ALBUMIN/GLOB SERPL: 1.8 G/DL
ALP SERPL-CCNC: 83 U/L (ref 39–117)
ALT SERPL-CCNC: 66 U/L (ref 1–41)
APPEARANCE UR: CLEAR
AST SERPL-CCNC: 50 U/L (ref 1–40)
BILIRUB SERPL-MCNC: 1 MG/DL (ref 0–1.2)
BILIRUB UR QL STRIP: NEGATIVE
BUN SERPL-MCNC: 19 MG/DL (ref 6–20)
BUN/CREAT SERPL: 16 (ref 7–25)
CALCIUM SERPL-MCNC: 9.7 MG/DL (ref 8.6–10.5)
CHLORIDE SERPL-SCNC: 100 MMOL/L (ref 98–107)
CHOLEST SERPL-MCNC: 195 MG/DL (ref 0–200)
CO2 SERPL-SCNC: 28.8 MMOL/L (ref 22–29)
COLOR UR: YELLOW
CREAT SERPL-MCNC: 1.19 MG/DL (ref 0.76–1.27)
EGFRCR SERPLBLD CKD-EPI 2021: 77.7 ML/MIN/1.73
ERYTHROCYTE [DISTWIDTH] IN BLOOD BY AUTOMATED COUNT: 13 % (ref 12.3–15.4)
GLOBULIN SER CALC-MCNC: 2.7 GM/DL
GLUCOSE SERPL-MCNC: 103 MG/DL (ref 65–99)
GLUCOSE UR QL STRIP: NEGATIVE
HCT VFR BLD AUTO: 48.6 % (ref 37.5–51)
HDLC SERPL-MCNC: 31 MG/DL (ref 40–60)
HGB BLD-MCNC: 16.7 G/DL (ref 13–17.7)
HGB UR QL STRIP: NEGATIVE
KETONES UR QL STRIP: NEGATIVE
LDLC SERPL CALC-MCNC: 76 MG/DL (ref 0–100)
LEUKOCYTE ESTERASE UR QL STRIP: NEGATIVE
MCH RBC QN AUTO: 31.7 PG (ref 26.6–33)
MCHC RBC AUTO-ENTMCNC: 34.4 G/DL (ref 31.5–35.7)
MCV RBC AUTO: 92.2 FL (ref 79–97)
NITRITE UR QL STRIP: NEGATIVE
PH UR STRIP: 5.5 [PH] (ref 5–8)
PLATELET # BLD AUTO: 198 10*3/MM3 (ref 140–450)
POTASSIUM SERPL-SCNC: 4.4 MMOL/L (ref 3.5–5.2)
PROT SERPL-MCNC: 7.6 G/DL (ref 6–8.5)
PROT UR QL STRIP: ABNORMAL
RBC # BLD AUTO: 5.27 10*6/MM3 (ref 4.14–5.8)
SODIUM SERPL-SCNC: 140 MMOL/L (ref 136–145)
SP GR UR STRIP: 1.02 (ref 1–1.03)
TRIGL SERPL-MCNC: 559 MG/DL (ref 0–150)
TSH SERPL DL<=0.005 MIU/L-ACNC: 2.06 UIU/ML (ref 0.27–4.2)
UROBILINOGEN UR STRIP-MCNC: ABNORMAL MG/DL
VLDLC SERPL CALC-MCNC: 88 MG/DL (ref 5–40)
WBC # BLD AUTO: 4.14 10*3/MM3 (ref 3.4–10.8)

## 2022-11-17 ENCOUNTER — OFFICE VISIT (OUTPATIENT)
Dept: FAMILY MEDICINE CLINIC | Facility: CLINIC | Age: 42
End: 2022-11-17

## 2022-11-17 VITALS
HEIGHT: 72 IN | BODY MASS INDEX: 31.29 KG/M2 | WEIGHT: 231 LBS | TEMPERATURE: 97.3 F | OXYGEN SATURATION: 97 % | SYSTOLIC BLOOD PRESSURE: 124 MMHG | HEART RATE: 73 BPM | DIASTOLIC BLOOD PRESSURE: 70 MMHG

## 2022-11-17 DIAGNOSIS — F41.9 ANXIETY AND DEPRESSION: ICD-10-CM

## 2022-11-17 DIAGNOSIS — Z00.00 ROUTINE ADULT HEALTH MAINTENANCE: Primary | ICD-10-CM

## 2022-11-17 DIAGNOSIS — E78.1 HYPERTRIGLYCERIDEMIA: ICD-10-CM

## 2022-11-17 DIAGNOSIS — F32.A ANXIETY AND DEPRESSION: ICD-10-CM

## 2022-11-17 PROCEDURE — 99396 PREV VISIT EST AGE 40-64: CPT | Performed by: FAMILY MEDICINE

## 2022-11-17 RX ORDER — DIPHENHYDRAMINE HCL 25 MG
TABLET ORAL NIGHTLY
COMMUNITY
Start: 2022-08-01

## 2022-11-17 RX ORDER — PAROXETINE HYDROCHLORIDE 40 MG/1
40 TABLET, FILM COATED ORAL EVERY MORNING
Qty: 90 TABLET | Refills: 3 | Status: SHIPPED | OUTPATIENT
Start: 2022-11-17

## 2022-11-17 RX ORDER — FENOFIBRATE 145 MG/1
145 TABLET, COATED ORAL
Qty: 90 TABLET | Refills: 3 | Status: SHIPPED | OUTPATIENT
Start: 2022-11-17

## 2022-11-17 NOTE — PROGRESS NOTES
"  Chief Complaint   Patient presents with   • Annual Exam       Subjective   Edi Dela Cruz is a 42 y.o. male and is here for a yearly physical exam. The patient reports problems - still sleep walking and eating.    Do you take any herbs or supplements that were not prescribed by a doctor? yes. If so, these will be added to active medication list.    The following portions of the patient's history were reviewed and updated as appropriate: allergies, current medications, past family history, past medical history, past social history, past surgical history and problem list.    Social and Family and Surgical History reviewed and updated today, see Rooming tab.    Health History, Preventive Measures and Vaccination flow sheets reviewed and updated today.    Patient's current medical chart in Epic; including previous office notes, Mychart messages, recent phone calls, imaging, labs, specialist's evaluation either in notes or in Media tab reviewed today.    Other outside pertinent medical information also reviewed thru Care Everywhere function is also reviewed today.    Review of Systems  Review of Systems  A comprehensive review of systems was negative.    Vitals:    11/17/22 0947   BP: 124/70   BP Location: Left arm   Patient Position: Sitting   Cuff Size: Large Adult   Pulse: 73   Temp: 97.3 °F (36.3 °C)   SpO2: 97%   Weight: 105 kg (231 lb)   Height: 182.9 cm (72\")     Body mass index is 31.33 kg/m².      General Appearance:  Alert, cooperative, no distress, appears stated age   Head:  Normocephalic, without obvious abnormality, atraumatic   Eyes:  PERRL, conjunctiva/corneas clear, EOM's intact.   Ears:  Normal TM's and external ear canals, both ears   Nose: Nares normal, septum midline, mucosa normal, no drainage or sinus tenderness   Throat: Lips, mucosa, and tongue normal; teeth and gums normal   Neck: Supple, symmetrical, trachea midline, no adenopathy;   thyroid: No enlargement/tenderness/nodules; no " carotid  bruit   Back:  Symmetric, no curvature, ROM normal, no CVA tenderness   Lungs:  Clear to auscultation bilaterally, respirations unlabored   Chest wall:  No tenderness or deformity   Heart:  Regular rate and rhythm, S1 and S2 normal, no murmur, rub or gallop   Abdomen:  Soft, non-tender, bowel sounds active all four quadrants,   no masses, no organomegaly   Rectal:        Extremities: Extremities normal, atraumatic, no cyanosis or edema   Pulses: 2+ and symmetric all extremities   Skin: Skin color, texture, turgor normal, no rashes or lesions   Lymph nodes: Cervical, supraclavicular, and axillary nodes normal   Neurologic: CNII-XII intact. Normal strength, sensation.          Results for orders placed or performed in visit on 11/07/22   Urinalysis With Microscopic If Indicated (No Culture) - Urine, Clean Catch    Specimen: Urine, Clean Catch   Result Value Ref Range    Specific Gravity, UA 1.022 1.005 - 1.030    pH, UA 5.5 5.0 - 8.0    Color, UA Yellow     Appearance, UA Clear Clear    Leukocytes, UA Negative Negative    Protein Trace (A) Negative    Glucose, UA Negative Negative    Ketones Negative Negative    Blood, UA Negative Negative    Bilirubin, UA Negative Negative    Urobilinogen, UA Comment     Nitrite, UA Negative Negative   TSH    Specimen: Blood   Result Value Ref Range    TSH 2.060 0.270 - 4.200 uIU/mL   Lipid Panel    Specimen: Blood   Result Value Ref Range    Total Cholesterol 195 0 - 200 mg/dL    Triglycerides 559 (H) 0 - 150 mg/dL    HDL Cholesterol 31 (L) 40 - 60 mg/dL    VLDL Cholesterol Giovanni 88 (H) 5 - 40 mg/dL    LDL Chol Calc (NIH) 76 0 - 100 mg/dL   Comprehensive Metabolic Panel    Specimen: Blood   Result Value Ref Range    Glucose 103 (H) 65 - 99 mg/dL    BUN 19 6 - 20 mg/dL    Creatinine 1.19 0.76 - 1.27 mg/dL    EGFR Result 77.7 >60.0 mL/min/1.73    BUN/Creatinine Ratio 16.0 7.0 - 25.0    Sodium 140 136 - 145 mmol/L    Potassium 4.4 3.5 - 5.2 mmol/L    Chloride 100 98 - 107 mmol/L     Total CO2 28.8 22.0 - 29.0 mmol/L    Calcium 9.7 8.6 - 10.5 mg/dL    Total Protein 7.6 6.0 - 8.5 g/dL    Albumin 4.90 3.50 - 5.20 g/dL    Globulin 2.7 gm/dL    A/G Ratio 1.8 g/dL    Total Bilirubin 1.0 0.0 - 1.2 mg/dL    Alkaline Phosphatase 83 39 - 117 U/L    AST (SGOT) 50 (H) 1 - 40 U/L    ALT (SGPT) 66 (H) 1 - 41 U/L   CBC (No Diff)    Specimen: Blood   Result Value Ref Range    WBC 4.14 3.40 - 10.80 10*3/mm3    RBC 5.27 4.14 - 5.80 10*6/mm3    Hemoglobin 16.7 13.0 - 17.7 g/dL    Hematocrit 48.6 37.5 - 51.0 %    MCV 92.2 79.0 - 97.0 fL    MCH 31.7 26.6 - 33.0 pg    MCHC 34.4 31.5 - 35.7 g/dL    RDW 13.0 12.3 - 15.4 %    Platelets 198 140 - 450 10*3/mm3     Assessment & Plan   Healthy male exam.  Diagnoses and all orders for this visit:    1. Routine adult health maintenance (Primary)  -     CBC (No Diff); Future  -     Comprehensive Metabolic Panel; Future  -     Lipid Panel; Future    2. Hypertriglyceridemia  -     fenofibrate (Tricor) 145 MG tablet; Take 1 tablet by mouth Daily With Breakfast. Indications: High Amount of Triglycerides in the Blood  Dispense: 90 tablet; Refill: 3  -     Lipid Panel; Future    3. Anxiety and depression  -     PARoxetine (PAXIL) 40 MG tablet; Take 1 tablet by mouth Every Morning.  Dispense: 90 tablet; Refill: 3      1. Add Tricor  2. Patient Counseling:  --Nutrition: Stressed importance of moderation in: sodium, caffeine, , saturated fat and cholesterol.  Discussed: caloric balance, sufficient intake of fresh fruits, vegetables, fiber, calcium, iron.  --Exercise: Stressed the importance of regular exercise.   --Substance Abuse: Discussed cessation and or primary prevention of tobacco, alcohol, or other drug use; driving or other dangerous activities under the influence.    --Dental health: Discussed importance of regular tooth brushing, flossing, and dental visits.  --Suggested having eyes and vision checked if needed or past due.  --Immunizations reviewed and given if  needed.  -  3. Discussed the patient's BMI with him.  The BMI is in the acceptable range  4. Follow up in one year    There are no Patient Instructions on file for this visit.    Medications Discontinued During This Encounter   Medication Reason   • HYDROcodone-acetaminophen (NORCO) 5-325 MG per tablet *Therapy completed   • multivitamin with minerals tablet tablet *Therapy completed   • PARoxetine (PAXIL) 40 MG tablet Reorder        Dr. Zane Bennett MD  [place], [place]  [facility]

## 2023-11-06 DIAGNOSIS — F41.9 ANXIETY AND DEPRESSION: ICD-10-CM

## 2023-11-06 DIAGNOSIS — F32.A ANXIETY AND DEPRESSION: ICD-10-CM

## 2023-11-06 RX ORDER — PAROXETINE HYDROCHLORIDE 40 MG/1
40 TABLET, FILM COATED ORAL EVERY MORNING
Qty: 90 TABLET | Refills: 3 | Status: SHIPPED | OUTPATIENT
Start: 2023-11-06

## 2024-09-11 ENCOUNTER — TELEPHONE (OUTPATIENT)
Dept: FAMILY MEDICINE CLINIC | Facility: CLINIC | Age: 44
End: 2024-09-11
Payer: COMMERCIAL

## 2024-09-11 NOTE — TELEPHONE ENCOUNTER
----- Message from [facility] [place] [employer] sent at 9/10/2024 11:48 PM EDT -----  Regarding: Appointment Request ()  Contact: [phone]  Appointment Request From: Edi Dela Cruz    With Provider: Zane Bennett [-Primary Care Physician-]    Preferred Date Range: From 11/1/2024 To 11/15/2024    Preferred Times: Any    Reason: To address the following health maintenance concerns.  Colorectal Cancer Screening  Annual Physical    Comments:  Due to insurance issues, last years physical didn't happen, I want to get back on track, My wife just had the colorectal using the mail-in-box method, love that idea. Also would like to do the stay at home sleep study if that is possible (sleep anea).

## 2024-09-11 NOTE — TELEPHONE ENCOUNTER
HUB TO RELAY:   Tried to call patient to schedule physical & labs.   Patient did not answer, LVM.

## 2024-09-11 NOTE — TELEPHONE ENCOUNTER
Name: Jose De Jesus Edi J      Relationship: Self      Best Callback Number: [phone]      HUB PROVIDED THE RELAY MESSAGE FROM THE OFFICE      PATIENT: VOICED UNDERSTANDING AND HAS NO FURTHER QUESTIONS AT THIS TIME    ADDITIONAL INFORMATION: SCHEDULED PATIENT FOR APPOINTMENT FOR PHYSICAL BUT NO ACTIVE LABS WHERE IN HIS CHART

## 2024-11-05 DIAGNOSIS — Z00.00 ROUTINE ADULT HEALTH MAINTENANCE: Primary | ICD-10-CM

## 2024-11-05 DIAGNOSIS — Z00.00 ROUTINE ADULT HEALTH MAINTENANCE: ICD-10-CM

## 2024-11-07 LAB
ALBUMIN SERPL-MCNC: 4.8 G/DL (ref 4.1–5.1)
ALP SERPL-CCNC: 85 IU/L (ref 44–121)
ALT SERPL-CCNC: 57 IU/L (ref 0–44)
APPEARANCE UR: CLEAR
AST SERPL-CCNC: 72 IU/L (ref 0–40)
BILIRUB SERPL-MCNC: 1.2 MG/DL (ref 0–1.2)
BILIRUB UR QL STRIP: NEGATIVE
BUN SERPL-MCNC: 25 MG/DL (ref 6–24)
BUN/CREAT SERPL: 22 (ref 9–20)
CALCIUM SERPL-MCNC: 9.8 MG/DL (ref 8.7–10.2)
CHLORIDE SERPL-SCNC: 102 MMOL/L (ref 96–106)
CHOLEST SERPL-MCNC: 208 MG/DL (ref 100–199)
CO2 SERPL-SCNC: 24 MMOL/L (ref 20–29)
COLOR UR: YELLOW
CREAT SERPL-MCNC: 1.12 MG/DL (ref 0.76–1.27)
EGFRCR SERPLBLD CKD-EPI 2021: 83 ML/MIN/1.73
ERYTHROCYTE [DISTWIDTH] IN BLOOD BY AUTOMATED COUNT: 13 % (ref 11.6–15.4)
GLOBULIN SER CALC-MCNC: 3.1 G/DL (ref 1.5–4.5)
GLUCOSE SERPL-MCNC: 99 MG/DL (ref 70–99)
GLUCOSE UR QL STRIP: NEGATIVE
HCT VFR BLD AUTO: 49.5 % (ref 37.5–51)
HDLC SERPL-MCNC: 33 MG/DL
HGB BLD-MCNC: 16.8 G/DL (ref 13–17.7)
HGB UR QL STRIP: NEGATIVE
KETONES UR QL STRIP: NEGATIVE
LDLC SERPL CALC-MCNC: 112 MG/DL (ref 0–99)
LDLC/HDLC SERPL: 3.4 RATIO (ref 0–3.6)
LEUKOCYTE ESTERASE UR QL STRIP: NEGATIVE
MCH RBC QN AUTO: 32.1 PG (ref 26.6–33)
MCHC RBC AUTO-ENTMCNC: 33.9 G/DL (ref 31.5–35.7)
MCV RBC AUTO: 95 FL (ref 79–97)
MICRO URNS: NORMAL
NITRITE UR QL STRIP: NEGATIVE
PH UR STRIP: 6.5 [PH] (ref 5–7.5)
PLATELET # BLD AUTO: 191 X10E3/UL (ref 150–450)
POTASSIUM SERPL-SCNC: 4.4 MMOL/L (ref 3.5–5.2)
PROT SERPL-MCNC: 7.9 G/DL (ref 6–8.5)
PROT UR QL STRIP: NEGATIVE
RBC # BLD AUTO: 5.23 X10E6/UL (ref 4.14–5.8)
SODIUM SERPL-SCNC: 141 MMOL/L (ref 134–144)
SP GR UR STRIP: 1.02 (ref 1–1.03)
TRIGL SERPL-MCNC: 365 MG/DL (ref 0–149)
UROBILINOGEN UR STRIP-MCNC: 0.2 MG/DL (ref 0.2–1)
VLDLC SERPL CALC-MCNC: 63 MG/DL (ref 5–40)
WBC # BLD AUTO: 3.5 X10E3/UL (ref 3.4–10.8)

## 2024-11-13 ENCOUNTER — OFFICE VISIT (OUTPATIENT)
Dept: FAMILY MEDICINE CLINIC | Facility: CLINIC | Age: 44
End: 2024-11-13
Payer: COMMERCIAL

## 2024-11-13 VITALS
SYSTOLIC BLOOD PRESSURE: 144 MMHG | HEIGHT: 72 IN | TEMPERATURE: 98 F | WEIGHT: 232 LBS | HEART RATE: 84 BPM | BODY MASS INDEX: 31.42 KG/M2 | DIASTOLIC BLOOD PRESSURE: 100 MMHG | OXYGEN SATURATION: 99 %

## 2024-11-13 DIAGNOSIS — Z00.00 ROUTINE ADULT HEALTH MAINTENANCE: Primary | ICD-10-CM

## 2024-11-13 DIAGNOSIS — Z12.11 SCREEN FOR COLON CANCER: ICD-10-CM

## 2024-11-13 DIAGNOSIS — F41.9 ANXIETY AND DEPRESSION: ICD-10-CM

## 2024-11-13 DIAGNOSIS — F51.3 SLEEP WALKING AND EATING: ICD-10-CM

## 2024-11-13 DIAGNOSIS — F32.A ANXIETY AND DEPRESSION: ICD-10-CM

## 2024-11-13 DIAGNOSIS — E78.1 HYPERTRIGLYCERIDEMIA: ICD-10-CM

## 2024-11-13 PROBLEM — S83.242A ACUTE MEDIAL MENISCUS TEAR OF LEFT KNEE: Status: RESOLVED | Noted: 2022-03-02 | Resolved: 2024-11-13

## 2024-11-13 PROCEDURE — 99396 PREV VISIT EST AGE 40-64: CPT | Performed by: FAMILY MEDICINE

## 2024-11-13 RX ORDER — FENOFIBRATE 145 MG/1
145 TABLET, COATED ORAL
Qty: 90 TABLET | Refills: 3 | Status: SHIPPED | OUTPATIENT
Start: 2024-11-13

## 2024-11-13 RX ORDER — VALERIAN ROOT 500 MG
500 CAPSULE ORAL DAILY
COMMUNITY
Start: 2024-10-01

## 2024-11-13 RX ORDER — PAROXETINE 40 MG/1
40 TABLET, FILM COATED ORAL EVERY MORNING
Qty: 90 TABLET | Refills: 3 | Status: SHIPPED | OUTPATIENT
Start: 2024-11-13

## 2024-11-13 RX ORDER — DIPHENHYDRAMINE HCL 25 MG
25 CAPSULE ORAL AS NEEDED
COMMUNITY
Start: 2023-05-01

## 2024-11-13 NOTE — PROGRESS NOTES
"  Chief Complaint   Patient presents with    Annual Exam    sleep apnea concerns       Subjective   Edi Dela Cruz is a 44 y.o. male and is here for a yearly physical exam. The patient reports problems - still having frequent awakenings at night.  Sleepwalking and sleep eating.  Would like to see the sleep medicine physician .    Do you take any herbs or supplements that were not prescribed by a doctor? yes. If so, these will be added to active medication list.    The following portions of the patient's history were reviewed and updated as appropriate: allergies, current medications, past family history, past medical history, past social history, past surgical history, and problem list.    Social and Family and Surgical History reviewed and updated today.    Health and Surgical History, Preventive Measures and Vaccination flow sheets reviewed and updated today.    Patient's current medical chart in Epic; including previous office notes, Mychart messages, recent phone calls, imaging, labs, specialist's evaluation either in notes or in Media tab reviewed today.    Other outside pertinent medical information also reviewed thru Care Everywhere function is also reviewed today.    Review of Systems  Review of Systems  Pertinent items are noted in HPI.    Vitals:    11/13/24 1109   BP: 144/100   BP Location: Left arm   Patient Position: Sitting   Cuff Size: Adult   Pulse: 84   Temp: 98 °F (36.7 °C)   SpO2: 99%   Weight: 105 kg (232 lb)   Height: 182.9 cm (72\")     Body mass index is 31.46 kg/m².             General Appearance:  Alert, cooperative, no distress, appears stated age   Head:  Normocephalic, without obvious abnormality, atraumatic   Eyes:  PERRL, conjunctiva/corneas clear, EOM's intact.   Ears:  Normal TM's and external ear canals, both ears   Nose: Nares normal, septum midline, mucosa normal, no drainage or sinus tenderness   Throat: Lips, mucosa, and tongue normal; teeth and gums viewed   Neck: Supple, " symmetrical,  no adenopathy;   thyroid: No enlargement/tenderness/nodules;   no carotidbruit   Back:  Symmetric, no curvature, ROM normal, no CVA tenderness   Lungs:  Clear to auscultation bilaterally, respirations unlabored   Chest wall:  No tenderness or deformity   Heart:  Regular rate and rhythm, S1 and S2 normal, no murmur.   Abdomen:  Soft, non-tender, bowel sounds active all four quadrants,   no masses, no organomegaly   Rectal:        Extremities: Extremities normal, atraumatic, no cyanosis or edema   Pulses: 2+ and symmetric all extremities   Skin: Skin color, texture, turgor normal, no rashes. No suspicious lesions   Lymph nodes: Cervical, supraclavicular, and axillary nodes normal   Neurologic: CNII-XII intact. Normal strength, sensation.          Results for orders placed or performed in visit on 11/05/24   Urinalysis With Microscopic If Indicated (No Culture) - Urine, Clean Catch    Collection Time: 11/06/24 11:17 AM    Specimen: Urine, Clean Catch   Result Value Ref Range    Specific Gravity, UA 1.018 1.005 - 1.030    pH, UA 6.5 5.0 - 7.5    Color, UA Yellow Yellow    Appearance, UA Clear Clear    Leukocytes, UA Negative Negative    Protein Negative Negative/Trace    Glucose, UA Negative Negative    Ketones Negative Negative    Blood, UA Negative Negative    Bilirubin, UA Negative Negative    Urobilinogen, UA 0.2 0.2 - 1.0 mg/dL    Nitrite, UA Negative Negative    Microscopic Examination Comment    Lipid Panel With LDL/HDL Ratio    Collection Time: 11/06/24 11:17 AM    Specimen: Blood   Result Value Ref Range    Total Cholesterol 208 (H) 100 - 199 mg/dL    Triglycerides 365 (H) 0 - 149 mg/dL    HDL Cholesterol 33 (L) >39 mg/dL    VLDL Cholesterol Giovanni 63 (H) 5 - 40 mg/dL    LDL Chol Calc (NIH) 112 (H) 0 - 99 mg/dL    LDL/HDL RATIO 3.4 0.0 - 3.6 ratio   Comprehensive metabolic panel    Collection Time: 11/06/24 11:17 AM    Specimen: Blood   Result Value Ref Range    Glucose 99 70 - 99 mg/dL    BUN 25 (H)  6 - 24 mg/dL    Creatinine 1.12 0.76 - 1.27 mg/dL    EGFR Result 83 >59 mL/min/1.73    BUN/Creatinine Ratio 22 (H) 9 - 20    Sodium 141 134 - 144 mmol/L    Potassium 4.4 3.5 - 5.2 mmol/L    Chloride 102 96 - 106 mmol/L    Total CO2 24 20 - 29 mmol/L    Calcium 9.8 8.7 - 10.2 mg/dL    Total Protein 7.9 6.0 - 8.5 g/dL    Albumin 4.8 4.1 - 5.1 g/dL    Globulin 3.1 1.5 - 4.5 g/dL    Total Bilirubin 1.2 0.0 - 1.2 mg/dL    Alkaline Phosphatase 85 44 - 121 IU/L    AST (SGOT) 72 (H) 0 - 40 IU/L    ALT (SGPT) 57 (H) 0 - 44 IU/L   CBC (No Diff)    Collection Time: 11/06/24 11:17 AM    Specimen: Blood   Result Value Ref Range    WBC 3.5 3.4 - 10.8 x10E3/uL    RBC 5.23 4.14 - 5.80 x10E6/uL    Hemoglobin 16.8 13.0 - 17.7 g/dL    Hematocrit 49.5 37.5 - 51.0 %    MCV 95 79 - 97 fL    MCH 32.1 26.6 - 33.0 pg    MCHC 33.9 31.5 - 35.7 g/dL    RDW 13.0 11.6 - 15.4 %    Platelets 191 150 - 450 x10E3/uL     Assessment & Plan   Healthy male exam.  Diagnoses and all orders for this visit:    1. Routine adult health maintenance (Primary)    2. Hypertriglyceridemia  -     fenofibrate (Tricor) 145 MG tablet; Take 1 tablet by mouth Daily With Breakfast. Indications: High Amount of Triglycerides in the Blood  Dispense: 90 tablet; Refill: 3    3. Sleep walking and eating  -     Ambulatory Referral to Sleep Medicine    4. Anxiety and depression  -     PARoxetine (PAXIL) 40 MG tablet; Take 1 tablet by mouth Every Morning.  Dispense: 90 tablet; Refill: 3    5. Screen for colon cancer  -     Cologuard - Stool, Per Rectum; Future      1.  Ran out of medications.  Renewed today  2. Patient Counseling:  --Nutrition: Stressed importance of moderation in: sodium, caffeine, , saturated fat and cholesterol.  Discussed: caloric balance, sufficient intake of fresh fruits, vegetables, fiber, calcium, iron.  --Exercise: Stressed the importance of regular exercise.   --Substance Abuse: Discussed cessation and or reduction of tobacco, alcohol, or other drug  use; driving or other dangerous activities under the influence.    --Dental health: Discussed importance of regular tooth brushing, flossing, and dental visits.  --Suggested having eyes and vision checked if needed or past due.  --Immunizations reviewed and given if needed.  --Discussed benefits of screening colonoscopy and or ColoGuard.  3. Discussed the patient's BMI with him.  The BMI is in the acceptable range  4. Follow up in one year    There are no Patient Instructions on file for this visit.    Medications Discontinued During This Encounter   Medication Reason    fenofibrate (Tricor) 145 MG tablet Reorder    PARoxetine (PAXIL) 40 MG tablet Reorder        Dr. Zane Bennett MD  [place], [place]  [facility]

## 2025-01-03 ENCOUNTER — OFFICE VISIT (OUTPATIENT)
Dept: SLEEP MEDICINE | Facility: HOSPITAL | Age: 45
End: 2025-01-03
Payer: COMMERCIAL

## 2025-01-03 VITALS — BODY MASS INDEX: 31.69 KG/M2 | WEIGHT: 234 LBS | HEIGHT: 72 IN | HEART RATE: 88 BPM | OXYGEN SATURATION: 98 %

## 2025-01-03 DIAGNOSIS — R29.818 SUSPECTED SLEEP APNEA: ICD-10-CM

## 2025-01-03 DIAGNOSIS — R06.83 SNORING: Primary | ICD-10-CM

## 2025-01-03 DIAGNOSIS — E66.811 CLASS 1 OBESITY WITH BODY MASS INDEX (BMI) OF 31.0 TO 31.9 IN ADULT, UNSPECIFIED OBESITY TYPE, UNSPECIFIED WHETHER SERIOUS COMORBIDITY PRESENT: ICD-10-CM

## 2025-01-03 DIAGNOSIS — Z82.0 FAMILY HISTORY OF SLEEP APNEA: ICD-10-CM

## 2025-01-03 PROCEDURE — G0463 HOSPITAL OUTPT CLINIC VISIT: HCPCS

## 2025-01-03 NOTE — PROGRESS NOTES
[facility]  [address]  [address]  [place] [address]  Phone   Fax      Edi Dela Cruz  [id number]   1980  44 y.o.  male      Referring Provider and PCP: Zane Bennett MD    Type of service: Initial Sleep Medicine Consult  Date of service: 1/3/2025          CHIEF COMPLAINT: Suspected sleep apnea, nighttime eating      HISTORY OF PRESENT ILLNESS:  Edi Dela Cruz 44 y.o. was seen today on 1/3/2025 at [facility].  Patient has a history of Elsa and depression, elevated triglycerides, for which the patient follows with outside providers.  Patient has no history of tonsillectomy, adenoidectomy, nasal surgery, UPPP.  Patient presents today with symptoms of loud snoring in all sleeping positions, history of waking up gasping for breath at night, frequent nighttime awakenings/restless sleep, nighttime eating.  Does have family history of sleep apnea.  Significant other with him in the office today, per patient preference.    He reports he wakes up a couple of times at night and will eat large amounts.  For example, he will wake up at night and get a really strong craving for cookies and then go to the kitchen and eat 3 chocolate chip cookies.  He reports he gets back to bed very quickly thereafter and is usually able to fall back to sleep easily.  He then feels guilty in the morning for eating so much at night, and he reports his eating during the day is restrictive. He also uses marijuana at night.  Also takes paroxetine at night though he reports his provider told him he could take this anytime during the day and he is interested in trying to take this in the morning instead.  He does report he has been eating large amounts at night since he was a child, even prior to being on the paroxetine.   He denies any dangerous nocturnal activities or behaviors.  He sometimes takes Benadryl for bad seasonal allergies but he tries to avoid this for  sleep as he feels it may actually have the opposite effect on him and wake him up.  He does take 10 mg of melatonin nightly and we discussed trying to avoid regular use and trying to wean back on this.  Handout provided on healthy sleep habits.  Discussed stress management, meditation, possible yoga in the evenings.          SLEEP HISTORY:  Sleep schedule:  Bedtime: 11 PM to midnight  Wake time: 8 to 9 AM  Time it takes to fall asleep: He reads or scrolls on phone for 30 to 60 minutes and then falls asleep within minutes of shutting things down for the night  Average hours of sleep: 8  Number of naps per day: 0    Symptoms:   In addition to the above, patient reports the following associated symptoms:  Have you ever awakened gasping for breath, coughing, choking: Yes   Change in weight:  No   Morning headaches:  No   Awaken with a sore throat or dry mouth:  No   Leg jerking at night:  No   Creepy crawly feeling in legs/urge to move legs: No   Teeth grinding: Yes   Have you ever awakened at night with a sour taste or burning sensation in your chest:  No   Do you have muscle weakness with laughing or anger:  No   Have you ever felt paralyzed while going to sleep or waking up:  No   Sleepwalking: As a child  Nightmares: No   Nocturia (urination at night): 2 times per night  Trouble falling asleep: No  Trouble staying asleep: Yes  Restless sleep: Yes  Memory Problem: No     Medical Conditions (PMH):   Elevated triglycerides  Anxiety and depression    Social history:  Shift work:  No   Tobacco use: Former, ages [age]  Alcohol use: 1 per week  Caffeinated drinks: 6 per day  Drug use: Marijuana    Family History (parents and siblings) (pertaining to sleep medicine):  Sleep apnea    Medications: reviewed    Allergies:  Amoxil [amoxicillin], Metaxalone, Penicillins, and Montelukast      REVIEW OF SYSTEMS:  Pertinent positive symptoms are:  Snoring  [place] Sleepiness Scale of Total score: 7   Fatigue  Intermittent nasal  "congestion      PHYSICAL EXAM:  CONSTITUTINONAL:   Vitals:    01/03/25 1139   Pulse: 88   SpO2: 98%   Weight: 106 kg (234 lb)   Height: 182.9 cm (72\")    Body mass index is 31.74 kg/m².   HEAD: atraumatic, normocephalic   THROAT: tonsils are not enlarged, Mallampati class II-III  NECK: Neck Circumference: 17.5 inches, trachea is midline  RESPIRATORY SYSTEM: Respirations even, unlabored, normal rate  CARDIOVASULAR SYSTEM: Normal rate, no edema   NEUROLOGICAL SYSTEM: Alert and oriented x 3  PSYCHIATRIC SYSTEM: Mood is normal/ appropriate     Office note(s) from care team reviewed. Office note(s) reviewed: 11/13/24 family medicine note    Labs/ Test Results Reviewed:      11/2024 CMP, lipid panel          ASSESSMENT AND PLAN:   Suspected sleep apnea: patient's symptoms and physical examination are concerning for possible sleep apnea.   I discussed the signs, symptoms, and pathophysiology of sleep apnea with this patient.  I also discussed the possible complications of untreated sleep apnea including but not limited to potential risk of resistant hypertension, insulin resistance, pulmonary hypertension, atrial fibrillation or other arrhythmias, heart attack, stroke, nonrestorative sleep with hypersomnia which can increase risk for motor vehicle accidents, etc.   Different testing methods including home-based and lab based sleep studies were discussed with this patient.   Based on patient history and physical examination, will proceed with home sleep study.  [facility].  The test will be scheduled after prior authorization has been obtained through patient's insurance.  Discussed overview of treatment options for sleep apnea in the office today including PAP therapy, and treatment/ management will be discussed in more detail with this patient after the test is completed.  All questions were answered to patient's satisfaction.   Snoring: snoring is the sound created by turbulent " "airflow vibrating upper airway soft tissue.  I have also discussed factors affecting snoring including sleep deprivation, sleeping on the back and alcohol ingestion. To minimize snoring, patient is advised to have adequate sleep, sleep on their side, and avoid alcohol and sedative medications around bedtime. Do not drive, operate heavy machinery, or do activities that require high concentration if feeling tired/drowsy.  Obesity: Body mass index is 31.74 kg/m².. Patients who are overweight or obese are at increased risk of sleep apnea/ sleep disordered breathing. Weight reduction and healthy lifestyle are encouraged in overweight/ obese patients as part of a comprehensive approach to sleep apnea treatment.     Sleep disturbance/nighttime eating: It sounds like patient is awake/ aware for these episodes/able to recall events.  He reports he wakes up and will get a strong craving for food or look forward to eating cookies and go to the kitchen and may \"binge eat.\"  He reports he has been doing this ever since he was a child, even prior to being on paroxetine.  He then feels guilty the next day for how much he has eaten and has restrictive eating during the day.  He reports he probably is not taking enough calories during the day.  He did sleepwalk as a child. Did discuss that any sedative or mind altering medication or substance could potentially make issues worse.  We discussed considering weaning off of marijuana. Also recommended avoiding Benadryl for sleep.  Also discussed trying to wean back on and avoid long-term/regular use of melatonin.  Will evaluate for sleep apnea, as above.  We discussed trying to make sure he is having adequate calorie intake during the day with healthy foods and trying to manage stress.  Discussed possible meditation/yoga.  Recommended trying to avoid screens in the bedroom.  Discussed relaxing bedtime routine.  Handout provided on healthy sleep habits.  We did discuss possible referral " to behavioral health provider if issues persist despite the above, patient will let us know if he would like to proceed with this.      Patient will follow-up after study, 31 to 90 days after PAP therapy initiated if applicable, or contact the office sooner for questions or concerns. Patient's questions were answered.            Thank you again for asking me to consult on this patient.  Please do not hesitate to call me if you have additional questions or concerns.       Liz Cardenas DNP, APRN  [facility]

## 2025-01-28 ENCOUNTER — HOSPITAL ENCOUNTER (OUTPATIENT)
Dept: SLEEP MEDICINE | Facility: HOSPITAL | Age: 45
Discharge: HOME | End: 2025-01-28
Admitting: NURSE PRACTITIONER
Payer: COMMERCIAL

## 2025-01-28 DIAGNOSIS — R06.83 SNORING: ICD-10-CM

## 2025-01-28 DIAGNOSIS — R29.818 SUSPECTED SLEEP APNEA: ICD-10-CM

## 2025-01-28 DIAGNOSIS — E66.811 CLASS 1 OBESITY WITH BODY MASS INDEX (BMI) OF 31.0 TO 31.9 IN ADULT, UNSPECIFIED OBESITY TYPE, UNSPECIFIED WHETHER SERIOUS COMORBIDITY PRESENT: ICD-10-CM

## 2025-01-28 DIAGNOSIS — Z82.0 FAMILY HISTORY OF SLEEP APNEA: ICD-10-CM

## 2025-01-28 PROCEDURE — G0399 HOME SLEEP TEST/TYPE 3 PORTA: HCPCS

## 2025-01-30 DIAGNOSIS — R06.83 SNORING: ICD-10-CM

## 2025-01-30 DIAGNOSIS — G47.33 OSA (OBSTRUCTIVE SLEEP APNEA): Primary | ICD-10-CM

## 2025-01-31 ENCOUNTER — TELEPHONE (OUTPATIENT)
Dept: SLEEP MEDICINE | Facility: HOSPITAL | Age: 45
End: 2025-01-31
Payer: COMMERCIAL

## 2025-02-03 ENCOUNTER — TELEPHONE (OUTPATIENT)
Dept: SLEEP MEDICINE | Facility: HOSPITAL | Age: 45
End: 2025-02-03
Payer: COMMERCIAL

## 2025-05-05 DIAGNOSIS — F32.A ANXIETY AND DEPRESSION: ICD-10-CM

## 2025-05-05 DIAGNOSIS — F41.9 ANXIETY AND DEPRESSION: ICD-10-CM

## 2025-05-05 RX ORDER — PAROXETINE 40 MG/1
40 TABLET, FILM COATED ORAL EVERY MORNING
Qty: 90 TABLET | Refills: 3 | Status: SHIPPED | OUTPATIENT
Start: 2025-05-05

## (undated) DEVICE — BNDG ELAS ELITE V/CLOSE 4IN 5YD LF STRL

## (undated) DEVICE — DISPOSABLE TOURNIQUET CUFF SINGLE BLADDER, SINGLE PORT AND QUICK CONNECT CONNECTOR: Brand: COLOR CUFF

## (undated) DEVICE — PK ARTHSCP 40

## (undated) DEVICE — BLD DISSCT COOL CUT SJ CRVD 4MM 13CM

## (undated) DEVICE — ABL APOLLO RF M/PRT 50D

## (undated) DEVICE — UNDERCAST PADDING: Brand: DEROYAL

## (undated) DEVICE — GLV SURG BIOGEL LTX PF 6 1/2

## (undated) DEVICE — DRSNG WND GZ CURAD OIL EMULSION 3X3IN STRL

## (undated) DEVICE — TBG ARTHSCP PT W CONN/REDUC 8FT

## (undated) DEVICE — SUT ETHLN 3/0 PS1 18IN 1663H

## (undated) DEVICE — SKIN PREP TRAY W/CHG: Brand: MEDLINE INDUSTRIES, INC.